# Patient Record
Sex: MALE | Race: WHITE | NOT HISPANIC OR LATINO | Employment: PART TIME | URBAN - NONMETROPOLITAN AREA
[De-identification: names, ages, dates, MRNs, and addresses within clinical notes are randomized per-mention and may not be internally consistent; named-entity substitution may affect disease eponyms.]

---

## 2017-12-13 ENCOUNTER — HOSPITAL ENCOUNTER (EMERGENCY)
Facility: HOSPITAL | Age: 37
Discharge: HOME | End: 2017-12-13
Attending: EMERGENCY MEDICINE | Admitting: EMERGENCY MEDICINE

## 2017-12-13 VITALS
SYSTOLIC BLOOD PRESSURE: 113 MMHG | OXYGEN SATURATION: 98 % | WEIGHT: 180 LBS | HEIGHT: 72 IN | TEMPERATURE: 99.1 F | RESPIRATION RATE: 16 BRPM | BODY MASS INDEX: 24.38 KG/M2 | DIASTOLIC BLOOD PRESSURE: 66 MMHG | HEART RATE: 82 BPM

## 2017-12-13 DIAGNOSIS — A54.9 GONORRHEA IN MALE: Primary | ICD-10-CM

## 2017-12-13 DIAGNOSIS — A64 STI (SEXUALLY TRANSMITTED INFECTION): ICD-10-CM

## 2017-12-13 LAB
6-ACETYL MORPHINE: NEGATIVE
ALBUMIN SERPL-MCNC: 4.1 G/DL (ref 3.5–5)
ALBUMIN/GLOB SERPL: 1.3 G/DL (ref 1.5–2.5)
ALP SERPL-CCNC: 74 U/L (ref 40–129)
ALT SERPL W P-5'-P-CCNC: 15 U/L (ref 10–44)
AMPHET+METHAMPHET UR QL: POSITIVE
ANION GAP SERPL CALCULATED.3IONS-SCNC: 4.3 MMOL/L (ref 3.6–11.2)
AST SERPL-CCNC: 21 U/L (ref 10–34)
BACTERIA UR QL AUTO: ABNORMAL /HPF
BARBITURATES UR QL SCN: NEGATIVE
BASOPHILS # BLD AUTO: 0.04 10*3/MM3 (ref 0–0.3)
BASOPHILS NFR BLD AUTO: 0.3 % (ref 0–2)
BENZODIAZ UR QL SCN: NEGATIVE
BILIRUB SERPL-MCNC: 0.2 MG/DL (ref 0.2–1.8)
BILIRUB UR QL STRIP: NEGATIVE
BUN BLD-MCNC: 10 MG/DL (ref 7–21)
BUN/CREAT SERPL: 12.7 (ref 7–25)
BUPRENORPHINE SERPL-MCNC: NEGATIVE NG/ML
C TRACH RRNA CVX QL NAA+PROBE: NOT DETECTED
CALCIUM SPEC-SCNC: 9 MG/DL (ref 7.7–10)
CANNABINOIDS SERPL QL: NEGATIVE
CHLORIDE SERPL-SCNC: 109 MMOL/L (ref 99–112)
CLARITY UR: ABNORMAL
CO2 SERPL-SCNC: 29.7 MMOL/L (ref 24.3–31.9)
COCAINE UR QL: NEGATIVE
COLOR UR: YELLOW
CREAT BLD-MCNC: 0.79 MG/DL (ref 0.43–1.29)
CRP SERPL-MCNC: 2.74 MG/DL (ref 0–0.99)
DEPRECATED RDW RBC AUTO: 42.9 FL (ref 37–54)
EOSINOPHIL # BLD AUTO: 0.52 10*3/MM3 (ref 0–0.7)
EOSINOPHIL NFR BLD AUTO: 4.4 % (ref 0–5)
ERYTHROCYTE [DISTWIDTH] IN BLOOD BY AUTOMATED COUNT: 13 % (ref 11.5–14.5)
ERYTHROCYTE [SEDIMENTATION RATE] IN BLOOD: 25 MM/HR (ref 0–15)
GFR SERPL CREATININE-BSD FRML MDRD: 111 ML/MIN/1.73
GLOBULIN UR ELPH-MCNC: 3.1 GM/DL
GLUCOSE BLD-MCNC: 86 MG/DL (ref 70–110)
GLUCOSE UR STRIP-MCNC: NEGATIVE MG/DL
HCT VFR BLD AUTO: 38.9 % (ref 42–52)
HGB BLD-MCNC: 12.9 G/DL (ref 14–18)
HGB UR QL STRIP.AUTO: ABNORMAL
HYALINE CASTS UR QL AUTO: ABNORMAL /LPF
IMM GRANULOCYTES # BLD: 0.03 10*3/MM3 (ref 0–0.03)
IMM GRANULOCYTES NFR BLD: 0.3 % (ref 0–0.5)
KETONES UR QL STRIP: NEGATIVE
LEUKOCYTE ESTERASE UR QL STRIP.AUTO: ABNORMAL
LYMPHOCYTES # BLD AUTO: 1.66 10*3/MM3 (ref 1–3)
LYMPHOCYTES NFR BLD AUTO: 13.9 % (ref 21–51)
MCH RBC QN AUTO: 31 PG (ref 27–33)
MCHC RBC AUTO-ENTMCNC: 33.2 G/DL (ref 33–37)
MCV RBC AUTO: 93.5 FL (ref 80–94)
METHADONE UR QL SCN: NEGATIVE
MONOCYTES # BLD AUTO: 1.39 10*3/MM3 (ref 0.1–0.9)
MONOCYTES NFR BLD AUTO: 11.7 % (ref 0–10)
N GONORRHOEA RRNA SPEC QL NAA+PROBE: DETECTED
NEUTROPHILS # BLD AUTO: 8.26 10*3/MM3 (ref 1.4–6.5)
NEUTROPHILS NFR BLD AUTO: 69.4 % (ref 30–70)
NITRITE UR QL STRIP: NEGATIVE
OPIATES UR QL: NEGATIVE
OSMOLALITY SERPL CALC.SUM OF ELEC: 283.3 MOSM/KG (ref 273–305)
OXYCODONE UR QL SCN: NEGATIVE
PCP UR QL SCN: NEGATIVE
PH UR STRIP.AUTO: 5.5 [PH] (ref 5–8)
PLATELET # BLD AUTO: 301 10*3/MM3 (ref 130–400)
PMV BLD AUTO: 9.3 FL (ref 6–10)
POTASSIUM BLD-SCNC: 3.9 MMOL/L (ref 3.5–5.3)
PROT SERPL-MCNC: 7.2 G/DL (ref 6–8)
PROT UR QL STRIP: ABNORMAL
RBC # BLD AUTO: 4.16 10*6/MM3 (ref 4.7–6.1)
RBC # UR: ABNORMAL /HPF
REF LAB TEST METHOD: ABNORMAL
SODIUM BLD-SCNC: 143 MMOL/L (ref 135–153)
SP GR UR STRIP: 1.03 (ref 1–1.03)
SQUAMOUS #/AREA URNS HPF: ABNORMAL /HPF
UROBILINOGEN UR QL STRIP: ABNORMAL
WBC NRBC COR # BLD: 11.9 10*3/MM3 (ref 4.5–12.5)
WBC UR QL AUTO: ABNORMAL /HPF

## 2017-12-13 PROCEDURE — 85025 COMPLETE CBC W/AUTO DIFF WBC: CPT | Performed by: PHYSICIAN ASSISTANT

## 2017-12-13 PROCEDURE — 81001 URINALYSIS AUTO W/SCOPE: CPT | Performed by: PHYSICIAN ASSISTANT

## 2017-12-13 PROCEDURE — 87086 URINE CULTURE/COLONY COUNT: CPT | Performed by: PHYSICIAN ASSISTANT

## 2017-12-13 PROCEDURE — 99283 EMERGENCY DEPT VISIT LOW MDM: CPT

## 2017-12-13 PROCEDURE — 80307 DRUG TEST PRSMV CHEM ANLYZR: CPT | Performed by: PHYSICIAN ASSISTANT

## 2017-12-13 PROCEDURE — 85652 RBC SED RATE AUTOMATED: CPT | Performed by: PHYSICIAN ASSISTANT

## 2017-12-13 PROCEDURE — 87491 CHLMYD TRACH DNA AMP PROBE: CPT | Performed by: PHYSICIAN ASSISTANT

## 2017-12-13 PROCEDURE — 25010000002 KETOROLAC TROMETHAMINE PER 15 MG: Performed by: PHYSICIAN ASSISTANT

## 2017-12-13 PROCEDURE — 96365 THER/PROPH/DIAG IV INF INIT: CPT

## 2017-12-13 PROCEDURE — 25010000002 CEFTRIAXONE: Performed by: PHYSICIAN ASSISTANT

## 2017-12-13 PROCEDURE — 87591 N.GONORRHOEAE DNA AMP PROB: CPT | Performed by: PHYSICIAN ASSISTANT

## 2017-12-13 PROCEDURE — 96375 TX/PRO/DX INJ NEW DRUG ADDON: CPT

## 2017-12-13 PROCEDURE — 86140 C-REACTIVE PROTEIN: CPT | Performed by: PHYSICIAN ASSISTANT

## 2017-12-13 PROCEDURE — 80053 COMPREHEN METABOLIC PANEL: CPT | Performed by: PHYSICIAN ASSISTANT

## 2017-12-13 RX ORDER — SODIUM CHLORIDE 0.9 % (FLUSH) 0.9 %
10 SYRINGE (ML) INJECTION AS NEEDED
Status: DISCONTINUED | OUTPATIENT
Start: 2017-12-13 | End: 2017-12-13 | Stop reason: HOSPADM

## 2017-12-13 RX ORDER — KETOROLAC TROMETHAMINE 30 MG/ML
30 INJECTION, SOLUTION INTRAMUSCULAR; INTRAVENOUS ONCE
Status: COMPLETED | OUTPATIENT
Start: 2017-12-13 | End: 2017-12-13

## 2017-12-13 RX ORDER — DOXYCYCLINE 100 MG/1
100 CAPSULE ORAL 2 TIMES DAILY
Qty: 20 CAPSULE | Refills: 0 | Status: ON HOLD | OUTPATIENT
Start: 2017-12-13 | End: 2018-07-30

## 2017-12-13 RX ORDER — IBUPROFEN 800 MG/1
800 TABLET ORAL EVERY 6 HOURS PRN
Qty: 60 TABLET | Refills: 0 | Status: ON HOLD | OUTPATIENT
Start: 2017-12-13 | End: 2018-07-30

## 2017-12-13 RX ADMIN — KETOROLAC TROMETHAMINE 30 MG: 30 INJECTION, SOLUTION INTRAMUSCULAR at 12:57

## 2017-12-13 RX ADMIN — CEFTRIAXONE 1 G: 1 INJECTION, POWDER, FOR SOLUTION INTRAMUSCULAR; INTRAVENOUS at 14:02

## 2017-12-13 RX ADMIN — SODIUM CHLORIDE 500 ML: 9 INJECTION, SOLUTION INTRAVENOUS at 12:57

## 2017-12-13 NOTE — ED PROVIDER NOTES
Subjective   HPI Comments: 37-year-old male presents with chief complaint penile discharge and burning with urination for the past 2 days.  Patient states she's had yellow discharge from his penis.  States that his girlfriend slept with 5 other than last month he thinks he was exposed to sexual transmitted infection.    Patient is a 37 y.o. male presenting with male genitourinary complaint.   History provided by:  Patient   used: No    Male  Problem   Presenting symptoms: dysuria, penile discharge, penile pain and swelling    Context: after urination and during urination    Context: not after injury and not after intercourse    Relieved by:  Nothing  Worsened by:  Nothing  Associated symptoms: urinary retention    Associated symptoms: no abdominal pain, no diarrhea, no genital itching, no groin pain, no hematuria, no nausea and no urinary incontinence    Risk factors: STI exposure and unprotected sex    Risk factors: no bladder surgery, no change in medication, no erectile dysfunction, no foreign body, does not have multiple sexual partners and no new sexual partner        Review of Systems   Gastrointestinal: Negative for abdominal pain, diarrhea and nausea.   Genitourinary: Positive for discharge, dysuria and penile pain. Negative for bladder incontinence and hematuria.   Skin: Positive for rash. Negative for wound.   All other systems reviewed and are negative.      Past Medical History:   Diagnosis Date   • Substance abuse    • Suicide attempt        No Known Allergies    Past Surgical History:   Procedure Laterality Date   • EYE SURGERY         Family History   Problem Relation Age of Onset   • Anxiety disorder Mother    • Drug abuse Father    • Alcohol abuse Father    • Anxiety disorder Paternal Aunt    • Anxiety disorder Maternal Grandmother    • Dementia Maternal Grandmother    • ADD / ADHD Neg Hx    • Bipolar disorder Neg Hx    • Depression Neg Hx    • OCD Neg Hx    • Paranoid behavior  Neg Hx    • Schizophrenia Neg Hx    • Seizures Neg Hx    • Suicide Attempts Neg Hx    • Self-Injurious Behavior  Neg Hx        Social History     Social History   • Marital status:      Spouse name: N/A   • Number of children: N/A   • Years of education: N/A     Social History Main Topics   • Smoking status: Current Every Day Smoker     Packs/day: 2.00   • Smokeless tobacco: None   • Alcohol use No   • Drug use: Yes     Special: Methamphetamines   • Sexual activity: Defer     Other Topics Concern   • None     Social History Narrative           Objective   Physical Exam   Constitutional: He is oriented to person, place, and time. He appears well-developed and well-nourished.   HENT:   Head: Normocephalic and atraumatic.   Right Ear: External ear normal.   Left Ear: External ear normal.   Nose: Nose normal.   Mouth/Throat: Oropharynx is clear and moist.   Eyes: Conjunctivae and EOM are normal. Pupils are equal, round, and reactive to light.   Neck: Normal range of motion. Neck supple.   Cardiovascular: Normal rate, regular rhythm, normal heart sounds and intact distal pulses.    Pulmonary/Chest: Effort normal and breath sounds normal.   Abdominal: Soft. Bowel sounds are normal.   Genitourinary: Rectal exam shows guaiac negative stool. Right testis shows no swelling and no tenderness. Left testis shows no swelling and no tenderness. Circumcised. Penile erythema and penile tenderness present. Discharge found.   Musculoskeletal: Normal range of motion. He exhibits no edema, tenderness or deformity.        Lumbar back: He exhibits swelling, pain and spasm. He exhibits no deformity.   Neurological: He is alert and oriented to person, place, and time. He has normal reflexes.   Skin: Skin is warm and dry.   Psychiatric: He has a normal mood and affect. His behavior is normal. Judgment and thought content normal.   Nursing note and vitals reviewed.      Procedures         ED Course  ED Course   Comment By Time   This  is a 37-year-old male that presents with chief complaint dysuria and penile discharge.  Patient did have positive gonorrhea test.  Patient was given IV Rocephin. Advised to return if condition worsens. Rene Marroquin PA-C 12/13 1502                  MDM  Number of Diagnoses or Management Options  Gonorrhea in male: new and requires workup  STI (sexually transmitted infection): new and requires workup     Amount and/or Complexity of Data Reviewed  Clinical lab tests: reviewed    Risk of Complications, Morbidity, and/or Mortality  Presenting problems: moderate  Diagnostic procedures: moderate  Management options: moderate    Patient Progress  Patient progress: stable      Final diagnoses:   Gonorrhea in male   STI (sexually transmitted infection)            Rene Marroquin PA-C  12/13/17 5135

## 2017-12-15 LAB — BACTERIA SPEC AEROBE CULT: NORMAL

## 2018-02-18 ENCOUNTER — HOSPITAL ENCOUNTER (EMERGENCY)
Facility: HOSPITAL | Age: 38
Discharge: HOME | End: 2018-02-18
Attending: FAMILY MEDICINE | Admitting: FAMILY MEDICINE

## 2018-02-18 ENCOUNTER — APPOINTMENT (OUTPATIENT)
Dept: GENERAL RADIOLOGY | Facility: HOSPITAL | Age: 38
End: 2018-02-18

## 2018-02-18 VITALS
BODY MASS INDEX: 24.38 KG/M2 | RESPIRATION RATE: 16 BRPM | HEART RATE: 65 BPM | TEMPERATURE: 97.9 F | OXYGEN SATURATION: 100 % | HEIGHT: 72 IN | WEIGHT: 180 LBS | DIASTOLIC BLOOD PRESSURE: 82 MMHG | SYSTOLIC BLOOD PRESSURE: 119 MMHG

## 2018-02-18 DIAGNOSIS — S52.501A CLOSED FRACTURE OF DISTAL END OF RIGHT RADIUS, UNSPECIFIED FRACTURE MORPHOLOGY, INITIAL ENCOUNTER: Primary | ICD-10-CM

## 2018-02-18 PROCEDURE — 99283 EMERGENCY DEPT VISIT LOW MDM: CPT

## 2018-02-18 PROCEDURE — 73130 X-RAY EXAM OF HAND: CPT

## 2018-02-18 PROCEDURE — 73110 X-RAY EXAM OF WRIST: CPT

## 2018-02-18 PROCEDURE — 73110 X-RAY EXAM OF WRIST: CPT | Performed by: RADIOLOGY

## 2018-02-18 PROCEDURE — 73130 X-RAY EXAM OF HAND: CPT | Performed by: RADIOLOGY

## 2018-02-18 RX ORDER — HYDROCODONE BITARTRATE AND ACETAMINOPHEN 7.5; 325 MG/1; MG/1
1 TABLET ORAL EVERY 6 HOURS PRN
Qty: 12 TABLET | Refills: 0 | Status: ON HOLD | OUTPATIENT
Start: 2018-02-18 | End: 2018-07-30

## 2018-02-18 RX ORDER — HYDROCODONE BITARTRATE AND ACETAMINOPHEN 7.5; 325 MG/1; MG/1
1 TABLET ORAL ONCE
Status: COMPLETED | OUTPATIENT
Start: 2018-02-18 | End: 2018-02-18

## 2018-02-18 RX ADMIN — HYDROCODONE BITARTRATE AND ACETAMINOPHEN 1 TABLET: 7.5; 325 TABLET ORAL at 08:18

## 2018-02-18 NOTE — ED NOTES
"13\" of 4\" volar splint extremity splint applied to right wrist by tech. Distal pulses intact, sensation intact and motor within normal limits.        Unique Thomas  02/18/18 0933    "

## 2018-02-18 NOTE — ED PROVIDER NOTES
Subjective   HPI Comments: 37-year-old male who presents today after saqib a door and injuring his right hand and wrist.  He states this occurred approximately 4 hours ago.  He states he was angry because his significant other was video chatting another man while he was sleeping.  He denies any previous injury to this area.  He states he did not take any medication prior to arrival.    Patient is a 37 y.o. male presenting with upper extremity pain.   History provided by:  Patient  Upper Extremity Issue   Location:  Wrist and hand  Wrist location:  R wrist  Hand location:  Dorsum of R hand  Injury: yes    Time since incident:  4 hours  Mechanism of injury comment:  Punched a door  Pain details:     Quality:  Throbbing    Radiates to:  Does not radiate    Severity:  Moderate    Onset quality:  Sudden    Timing:  Constant    Progression:  Worsening  Handedness:  Right-handed  Prior injury to area:  No  Relieved by:  Nothing  Worsened by:  Movement  Ineffective treatments:  None tried  Associated symptoms: decreased range of motion and swelling    Associated symptoms: no back pain, no fatigue, no fever, no muscle weakness, no neck pain, no numbness, no stiffness and no tingling        Review of Systems   Constitutional: Negative for fatigue and fever.   HENT: Negative.    Eyes: Negative.    Respiratory: Negative.    Cardiovascular: Negative.    Gastrointestinal: Negative.    Genitourinary: Negative.    Musculoskeletal: Positive for arthralgias. Negative for back pain, neck pain and stiffness.   Skin: Negative.    Neurological: Negative.    Psychiatric/Behavioral: Negative.    All other systems reviewed and are negative.      Past Medical History:   Diagnosis Date   • Substance abuse    • Suicide attempt        No Known Allergies    Past Surgical History:   Procedure Laterality Date   • EYE SURGERY         Family History   Problem Relation Age of Onset   • Anxiety disorder Mother    • Drug abuse Father    • Alcohol abuse  Father    • Anxiety disorder Paternal Aunt    • Anxiety disorder Maternal Grandmother    • Dementia Maternal Grandmother    • ADD / ADHD Neg Hx    • Bipolar disorder Neg Hx    • Depression Neg Hx    • OCD Neg Hx    • Paranoid behavior Neg Hx    • Schizophrenia Neg Hx    • Seizures Neg Hx    • Suicide Attempts Neg Hx    • Self-Injurious Behavior  Neg Hx        Social History     Social History   • Marital status:      Spouse name: N/A   • Number of children: N/A   • Years of education: N/A     Social History Main Topics   • Smoking status: Current Every Day Smoker     Packs/day: 2.00   • Smokeless tobacco: None   • Alcohol use No   • Drug use: Yes     Special: Methamphetamines   • Sexual activity: Defer     Other Topics Concern   • None     Social History Narrative           Objective   Physical Exam   Constitutional: He is oriented to person, place, and time. He appears well-developed and well-nourished. No distress.   HENT:   Head: Normocephalic and atraumatic.   Right Ear: External ear normal.   Left Ear: External ear normal.   Nose: Nose normal.   Mouth/Throat: Oropharynx is clear and moist.   Eyes: Conjunctivae and EOM are normal. Pupils are equal, round, and reactive to light.   Neck: Normal range of motion. Neck supple.   Cardiovascular: Normal rate, regular rhythm, normal heart sounds and intact distal pulses.    Pulmonary/Chest: Effort normal and breath sounds normal. No respiratory distress.   Abdominal: Soft. Bowel sounds are normal. There is no tenderness.   Musculoskeletal: He exhibits tenderness. He exhibits no deformity.   Tenderness to the right wrist, mild swelling more so on the radial side.  Right radial pulse is 2+.  Cap refill is < 2 seconds. No tenderness or swelling to the right phalanges.  No snuff box tenderness on the right.     Neurological: He is alert and oriented to person, place, and time.   Skin: Skin is warm and dry.   Psychiatric: He has a normal mood and affect. His behavior  is normal. Judgment and thought content normal.   Nursing note and vitals reviewed.      Splint - Cast - Strapping  Date/Time: 2/18/2018 9:39 AM  Performed by: SITA FINNEY  Authorized by: PALOMA GARCIA     Consent:     Consent obtained:  Verbal    Consent given by:  Patient  Pre-procedure details:     Sensation:  Normal    Skin color:  Pink  Procedure details:     Laterality:  Right    Location:  Wrist    Wrist:  R wrist    Splint type:  Volar short arm    Supplies:  Ortho-Glass  Post-procedure details:     Pain:  Improved    Sensation:  Normal    Patient tolerance of procedure:  Tolerated well, no immediate complications             ED Course  ED Course   Comment By Time   Distal radius fracture seen on wrist x-ray, neg hand x-ray.  Patient to be placed in a short arm volar splint and sling and will follow up with ortho. JORGE Fraire 02/18 0902   [facility] queried #[id number] JORGE Fraire 02/18 0904                  MDM  Number of Diagnoses or Management Options  Closed fracture of distal end of right radius, unspecified fracture morphology, initial encounter:      Amount and/or Complexity of Data Reviewed  Tests in the radiology section of CPT®: reviewed    Patient Progress  Patient progress: stable      Final diagnoses:   Closed fracture of distal end of right radius, unspecified fracture morphology, initial encounter            JORGE Fraire  02/18/18 0950

## 2018-02-18 NOTE — DISCHARGE INSTRUCTIONS
"Wear your splint until you follow up with ortho.  Use ice and elevate.    Hypertension  Hypertension, commonly called high blood pressure, is when the force of blood pumping through the arteries is too strong. The arteries are the blood vessels that carry blood from the heart throughout the body. Hypertension forces the heart to work harder to pump blood and may cause arteries to become narrow or stiff. Having untreated or uncontrolled hypertension can cause heart attacks, strokes, kidney disease, and other problems.  A blood pressure reading consists of a higher number over a lower number. Ideally, your blood pressure should be below 120/80. The first (\"top\") number is called the systolic pressure. It is a measure of the pressure in your arteries as your heart beats. The second (\"bottom\") number is called the diastolic pressure. It is a measure of the pressure in your arteries as the heart relaxes.  What are the causes?  The cause of this condition is not known.  What increases the risk?  Some risk factors for high blood pressure are under your control. Others are not.  Factors you can change   · Smoking.  · Having type 2 diabetes mellitus, high cholesterol, or both.  · Not getting enough exercise or physical activity.  · Being overweight.  · Having too much fat, sugar, calories, or salt (sodium) in your diet.  · Drinking too much alcohol.  Factors that are difficult or impossible to change   · Having chronic kidney disease.  · Having a family history of high blood pressure.  · Age. Risk increases with age.  · Race. You may be at higher risk if you are -American.  · Gender. Men are at higher risk than women before age 45. After age 65, women are at higher risk than men.  · Having obstructive sleep apnea.  · Stress.  What are the signs or symptoms?  Extremely high blood pressure (hypertensive crisis) may cause:  · Headache.  · Anxiety.  · Shortness of breath.  · Nosebleed.  · Nausea and vomiting.  · Severe " chest pain.  · Jerky movements you cannot control (seizures).  How is this diagnosed?  This condition is diagnosed by measuring your blood pressure while you are seated, with your arm resting on a surface. The cuff of the blood pressure monitor will be placed directly against the skin of your upper arm at the level of your heart. It should be measured at least twice using the same arm. Certain conditions can cause a difference in blood pressure between your right and left arms.  Certain factors can cause blood pressure readings to be lower or higher than normal (elevated) for a short period of time:  · When your blood pressure is higher when you are in a health care provider's office than when you are at home, this is called white coat hypertension. Most people with this condition do not need medicines.  · When your blood pressure is higher at home than when you are in a health care provider's office, this is called masked hypertension. Most people with this condition may need medicines to control blood pressure.  If you have a high blood pressure reading during one visit or you have normal blood pressure with other risk factors:  · You may be asked to return on a different day to have your blood pressure checked again.  · You may be asked to monitor your blood pressure at home for 1 week or longer.  If you are diagnosed with hypertension, you may have other blood or imaging tests to help your health care provider understand your overall risk for other conditions.  How is this treated?  This condition is treated by making healthy lifestyle changes, such as eating healthy foods, exercising more, and reducing your alcohol intake. Your health care provider may prescribe medicine if lifestyle changes are not enough to get your blood pressure under control, and if:  · Your systolic blood pressure is above 130.  · Your diastolic blood pressure is above 80.  Your personal target blood pressure may vary depending on your  medical conditions, your age, and other factors.  Follow these instructions at home:  Eating and drinking   · Eat a diet that is high in fiber and potassium, and low in sodium, added sugar, and fat. An example eating plan is called the DASH (Dietary Approaches to Stop Hypertension) diet. To eat this way:  ¨ Eat plenty of fresh fruits and vegetables. Try to fill half of your plate at each meal with fruits and vegetables.  ¨ Eat whole grains, such as whole wheat pasta, brown rice, or whole grain bread. Fill about one quarter of your plate with whole grains.  ¨ Eat or drink low-fat dairy products, such as skim milk or low-fat yogurt.  ¨ Avoid fatty cuts of meat, processed or cured meats, and poultry with skin. Fill about one quarter of your plate with lean proteins, such as fish, chicken without skin, beans, eggs, and tofu.  ¨ Avoid premade and processed foods. These tend to be higher in sodium, added sugar, and fat.  · Reduce your daily sodium intake. Most people with hypertension should eat less than 1,500 mg of sodium a day.  · Limit alcohol intake to no more than 1 drink a day for nonpregnant women and 2 drinks a day for men. One drink equals 12 oz of beer, 5 oz of wine, or 1½ oz of hard liquor.  Lifestyle   · Work with your health care provider to maintain a healthy body weight or to lose weight. Ask what an ideal weight is for you.  · Get at least 30 minutes of exercise that causes your heart to beat faster (aerobic exercise) most days of the week. Activities may include walking, swimming, or biking.  · Include exercise to strengthen your muscles (resistance exercise), such as pilates or lifting weights, as part of your weekly exercise routine. Try to do these types of exercises for 30 minutes at least 3 days a week.  · Do not use any products that contain nicotine or tobacco, such as cigarettes and e-cigarettes. If you need help quitting, ask your health care provider.  · Monitor your blood pressure at home as  told by your health care provider.  · Keep all follow-up visits as told by your health care provider. This is important.  Medicines   · Take over-the-counter and prescription medicines only as told by your health care provider. Follow directions carefully. Blood pressure medicines must be taken as prescribed.  · Do not skip doses of blood pressure medicine. Doing this puts you at risk for problems and can make the medicine less effective.  · Ask your health care provider about side effects or reactions to medicines that you should watch for.  Contact a health care provider if:  · You think you are having a reaction to a medicine you are taking.  · You have headaches that keep coming back (recurring).  · You feel dizzy.  · You have swelling in your ankles.  · You have trouble with your vision.  Get help right away if:  · You develop a severe headache or confusion.  · You have unusual weakness or numbness.  · You feel faint.  · You have severe pain in your chest or abdomen.  · You vomit repeatedly.  · You have trouble breathing.  Summary  · Hypertension is when the force of blood pumping through your arteries is too strong. If this condition is not controlled, it may put you at risk for serious complications.  · Your personal target blood pressure may vary depending on your medical conditions, your age, and other factors. For most people, a normal blood pressure is less than 120/80.  · Hypertension is treated with lifestyle changes, medicines, or a combination of both. Lifestyle changes include weight loss, eating a healthy, low-sodium diet, exercising more, and limiting alcohol.  This information is not intended to replace advice given to you by your health care provider. Make sure you discuss any questions you have with your health care provider.  Document Released: 12/18/2006 Document Revised: 11/15/2017 Document Reviewed: 11/15/2017  [employer] Interactive Patient Education © 2017 [employer]

## 2018-07-30 ENCOUNTER — HOSPITAL ENCOUNTER (INPATIENT)
Facility: HOSPITAL | Age: 38
LOS: 4 days | Discharge: HOME | End: 2018-08-03
Attending: PSYCHIATRY & NEUROLOGY | Admitting: PSYCHIATRY & NEUROLOGY

## 2018-07-30 ENCOUNTER — HOSPITAL ENCOUNTER (EMERGENCY)
Facility: HOSPITAL | Age: 38
Discharge: PSYCHIATRIC OR SUBSTANCE USE FACILITY | End: 2018-07-30
Attending: EMERGENCY MEDICINE | Admitting: EMERGENCY MEDICINE

## 2018-07-30 VITALS
HEART RATE: 80 BPM | WEIGHT: 180 LBS | TEMPERATURE: 98.2 F | SYSTOLIC BLOOD PRESSURE: 134 MMHG | DIASTOLIC BLOOD PRESSURE: 68 MMHG | HEIGHT: 72 IN | OXYGEN SATURATION: 99 % | BODY MASS INDEX: 24.38 KG/M2 | RESPIRATION RATE: 18 BRPM

## 2018-07-30 DIAGNOSIS — R45.851 DEPRESSION WITH SUICIDAL IDEATION: Primary | ICD-10-CM

## 2018-07-30 DIAGNOSIS — F19.10 SUBSTANCE ABUSE (HCC): ICD-10-CM

## 2018-07-30 DIAGNOSIS — F32.A DEPRESSION WITH SUICIDAL IDEATION: Primary | ICD-10-CM

## 2018-07-30 LAB
6-ACETYL MORPHINE: NEGATIVE
ALBUMIN SERPL-MCNC: 4.1 G/DL (ref 3.5–5)
ALBUMIN/GLOB SERPL: 1.4 G/DL (ref 1.5–2.5)
ALP SERPL-CCNC: 71 U/L (ref 40–129)
ALT SERPL W P-5'-P-CCNC: 16 U/L (ref 10–44)
AMPHET+METHAMPHET UR QL: NEGATIVE
ANION GAP SERPL CALCULATED.3IONS-SCNC: 3 MMOL/L (ref 3.6–11.2)
AST SERPL-CCNC: 21 U/L (ref 10–34)
BARBITURATES UR QL SCN: NEGATIVE
BASOPHILS # BLD AUTO: 0.06 10*3/MM3 (ref 0–0.3)
BASOPHILS NFR BLD AUTO: 0.8 % (ref 0–2)
BENZODIAZ UR QL SCN: NEGATIVE
BILIRUB SERPL-MCNC: 0.3 MG/DL (ref 0.2–1.8)
BILIRUB UR QL STRIP: NEGATIVE
BUN BLD-MCNC: 6 MG/DL (ref 7–21)
BUN/CREAT SERPL: 7.4 (ref 7–25)
BUPRENORPHINE SERPL-MCNC: NEGATIVE NG/ML
CALCIUM SPEC-SCNC: 9.3 MG/DL (ref 7.7–10)
CANNABINOIDS SERPL QL: NEGATIVE
CHLORIDE SERPL-SCNC: 107 MMOL/L (ref 99–112)
CLARITY UR: CLEAR
CO2 SERPL-SCNC: 31 MMOL/L (ref 24.3–31.9)
COCAINE UR QL: NEGATIVE
COLOR UR: YELLOW
CREAT BLD-MCNC: 0.81 MG/DL (ref 0.43–1.29)
DEPRECATED RDW RBC AUTO: 41.6 FL (ref 37–54)
EOSINOPHIL # BLD AUTO: 0.56 10*3/MM3 (ref 0–0.7)
EOSINOPHIL NFR BLD AUTO: 7.5 % (ref 0–5)
ERYTHROCYTE [DISTWIDTH] IN BLOOD BY AUTOMATED COUNT: 12.4 % (ref 11.5–14.5)
ETHANOL BLD-MCNC: <10 MG/DL
ETHANOL UR QL: <0.01 %
GFR SERPL CREATININE-BSD FRML MDRD: 108 ML/MIN/1.73
GLOBULIN UR ELPH-MCNC: 3 GM/DL
GLUCOSE BLD-MCNC: 89 MG/DL (ref 70–110)
GLUCOSE UR STRIP-MCNC: NEGATIVE MG/DL
HCT VFR BLD AUTO: 46.6 % (ref 42–52)
HGB BLD-MCNC: 15.3 G/DL (ref 14–18)
HGB UR QL STRIP.AUTO: NEGATIVE
IMM GRANULOCYTES # BLD: 0.03 10*3/MM3 (ref 0–0.03)
IMM GRANULOCYTES NFR BLD: 0.4 % (ref 0–0.5)
KETONES UR QL STRIP: NEGATIVE
LEUKOCYTE ESTERASE UR QL STRIP.AUTO: NEGATIVE
LYMPHOCYTES # BLD AUTO: 1.87 10*3/MM3 (ref 1–3)
LYMPHOCYTES NFR BLD AUTO: 25.2 % (ref 21–51)
MAGNESIUM SERPL-MCNC: 1.8 MG/DL (ref 1.7–2.6)
MCH RBC QN AUTO: 30.1 PG (ref 27–33)
MCHC RBC AUTO-ENTMCNC: 32.8 G/DL (ref 33–37)
MCV RBC AUTO: 91.7 FL (ref 80–94)
METHADONE UR QL SCN: NEGATIVE
MONOCYTES # BLD AUTO: 0.58 10*3/MM3 (ref 0.1–0.9)
MONOCYTES NFR BLD AUTO: 7.8 % (ref 0–10)
NEUTROPHILS # BLD AUTO: 4.33 10*3/MM3 (ref 1.4–6.5)
NEUTROPHILS NFR BLD AUTO: 58.3 % (ref 30–70)
NITRITE UR QL STRIP: NEGATIVE
OPIATES UR QL: NEGATIVE
OSMOLALITY SERPL CALC.SUM OF ELEC: 278.3 MOSM/KG (ref 273–305)
OXYCODONE UR QL SCN: NEGATIVE
PCP UR QL SCN: NEGATIVE
PH UR STRIP.AUTO: 7.5 [PH] (ref 5–8)
PLATELET # BLD AUTO: 277 10*3/MM3 (ref 130–400)
PMV BLD AUTO: 10.5 FL (ref 6–10)
POTASSIUM BLD-SCNC: 4.3 MMOL/L (ref 3.5–5.3)
PROT SERPL-MCNC: 7.1 G/DL (ref 6–8)
PROT UR QL STRIP: NEGATIVE
RBC # BLD AUTO: 5.08 10*6/MM3 (ref 4.7–6.1)
SODIUM BLD-SCNC: 141 MMOL/L (ref 135–153)
SP GR UR STRIP: 1.02 (ref 1–1.03)
UROBILINOGEN UR QL STRIP: NORMAL
WBC NRBC COR # BLD: 7.43 10*3/MM3 (ref 4.5–12.5)

## 2018-07-30 PROCEDURE — 99284 EMERGENCY DEPT VISIT MOD MDM: CPT

## 2018-07-30 PROCEDURE — HZ2ZZZZ DETOXIFICATION SERVICES FOR SUBSTANCE ABUSE TREATMENT: ICD-10-PCS | Performed by: PSYCHIATRY & NEUROLOGY

## 2018-07-30 PROCEDURE — 80307 DRUG TEST PRSMV CHEM ANLYZR: CPT | Performed by: PHYSICIAN ASSISTANT

## 2018-07-30 PROCEDURE — 83735 ASSAY OF MAGNESIUM: CPT | Performed by: PHYSICIAN ASSISTANT

## 2018-07-30 PROCEDURE — 93005 ELECTROCARDIOGRAM TRACING: CPT | Performed by: PSYCHIATRY & NEUROLOGY

## 2018-07-30 PROCEDURE — 85025 COMPLETE CBC W/AUTO DIFF WBC: CPT | Performed by: PHYSICIAN ASSISTANT

## 2018-07-30 PROCEDURE — 81003 URINALYSIS AUTO W/O SCOPE: CPT | Performed by: PHYSICIAN ASSISTANT

## 2018-07-30 PROCEDURE — 80053 COMPREHEN METABOLIC PANEL: CPT | Performed by: PHYSICIAN ASSISTANT

## 2018-07-30 PROCEDURE — 93010 ELECTROCARDIOGRAM REPORT: CPT | Performed by: INTERNAL MEDICINE

## 2018-07-30 RX ORDER — BENZONATATE 100 MG/1
100 CAPSULE ORAL 3 TIMES DAILY PRN
Status: DISCONTINUED | OUTPATIENT
Start: 2018-07-30 | End: 2018-08-03 | Stop reason: HOSPADM

## 2018-07-30 RX ORDER — FAMOTIDINE 20 MG/1
20 TABLET, FILM COATED ORAL 2 TIMES DAILY PRN
Status: DISCONTINUED | OUTPATIENT
Start: 2018-07-30 | End: 2018-08-03 | Stop reason: HOSPADM

## 2018-07-30 RX ORDER — ECHINACEA PURPUREA EXTRACT 125 MG
2 TABLET ORAL AS NEEDED
Status: DISCONTINUED | OUTPATIENT
Start: 2018-07-30 | End: 2018-08-03 | Stop reason: HOSPADM

## 2018-07-30 RX ORDER — MULTIVITAMIN
1 TABLET ORAL DAILY
Status: DISCONTINUED | OUTPATIENT
Start: 2018-07-30 | End: 2018-08-03 | Stop reason: HOSPADM

## 2018-07-30 RX ORDER — BENZTROPINE MESYLATE 1 MG/ML
0.5 INJECTION INTRAMUSCULAR; INTRAVENOUS DAILY PRN
Status: DISCONTINUED | OUTPATIENT
Start: 2018-07-30 | End: 2018-08-03 | Stop reason: HOSPADM

## 2018-07-30 RX ORDER — LORAZEPAM 2 MG/1
2 TABLET ORAL EVERY 6 HOURS PRN
Status: DISCONTINUED | OUTPATIENT
Start: 2018-07-30 | End: 2018-07-31

## 2018-07-30 RX ORDER — HYDROXYZINE 50 MG/1
50 TABLET, FILM COATED ORAL EVERY 6 HOURS PRN
Status: DISCONTINUED | OUTPATIENT
Start: 2018-07-30 | End: 2018-08-03 | Stop reason: HOSPADM

## 2018-07-30 RX ORDER — BENZTROPINE MESYLATE 1 MG/1
1 TABLET ORAL DAILY PRN
Status: DISCONTINUED | OUTPATIENT
Start: 2018-07-30 | End: 2018-08-03 | Stop reason: HOSPADM

## 2018-07-30 RX ORDER — TRAZODONE HYDROCHLORIDE 50 MG/1
50 TABLET ORAL NIGHTLY PRN
Status: DISCONTINUED | OUTPATIENT
Start: 2018-07-30 | End: 2018-08-03 | Stop reason: HOSPADM

## 2018-07-30 RX ORDER — LOPERAMIDE HYDROCHLORIDE 2 MG/1
2 CAPSULE ORAL 4 TIMES DAILY PRN
Status: DISCONTINUED | OUTPATIENT
Start: 2018-07-30 | End: 2018-08-03 | Stop reason: HOSPADM

## 2018-07-30 RX ORDER — THIAMINE HCL 50 MG
100 TABLET ORAL DAILY
Status: DISCONTINUED | OUTPATIENT
Start: 2018-07-30 | End: 2018-08-03 | Stop reason: HOSPADM

## 2018-07-30 RX ORDER — ALUMINA, MAGNESIA, AND SIMETHICONE 2400; 2400; 240 MG/30ML; MG/30ML; MG/30ML
15 SUSPENSION ORAL EVERY 6 HOURS PRN
Status: DISCONTINUED | OUTPATIENT
Start: 2018-07-30 | End: 2018-08-03 | Stop reason: HOSPADM

## 2018-07-30 RX ORDER — IBUPROFEN 600 MG/1
600 TABLET ORAL EVERY 6 HOURS PRN
Status: DISCONTINUED | OUTPATIENT
Start: 2018-07-30 | End: 2018-08-03 | Stop reason: HOSPADM

## 2018-07-30 RX ORDER — NICOTINE 21 MG/24HR
1 PATCH, TRANSDERMAL 24 HOURS TRANSDERMAL
Status: DISCONTINUED | OUTPATIENT
Start: 2018-07-30 | End: 2018-08-01

## 2018-07-30 RX ORDER — ONDANSETRON 4 MG/1
4 TABLET, FILM COATED ORAL EVERY 6 HOURS PRN
Status: DISCONTINUED | OUTPATIENT
Start: 2018-07-30 | End: 2018-08-03 | Stop reason: HOSPADM

## 2018-07-30 RX ADMIN — THIAMINE HCL (VITAMIN B1) 50 MG TABLET 100 MG: at 17:36

## 2018-07-30 RX ADMIN — Medication 1 TABLET: at 17:36

## 2018-07-30 RX ADMIN — TRAZODONE HYDROCHLORIDE 50 MG: 50 TABLET ORAL at 21:16

## 2018-07-30 RX ADMIN — NICOTINE 1 PATCH: 21 PATCH TRANSDERMAL at 16:40

## 2018-07-31 PROBLEM — F43.10 POST TRAUMATIC STRESS DISORDER (PTSD): Status: ACTIVE | Noted: 2018-07-31

## 2018-07-31 PROBLEM — F10.24 ALCOHOL DEPENDENCE WITH ALCOHOL-INDUCED MOOD DISORDER (HCC): Status: ACTIVE | Noted: 2018-07-31

## 2018-07-31 PROCEDURE — 99223 1ST HOSP IP/OBS HIGH 75: CPT | Performed by: PSYCHIATRY & NEUROLOGY

## 2018-07-31 RX ORDER — QUETIAPINE FUMARATE 25 MG/1
50 TABLET, FILM COATED ORAL NIGHTLY
Status: DISCONTINUED | OUTPATIENT
Start: 2018-07-31 | End: 2018-08-03 | Stop reason: HOSPADM

## 2018-07-31 RX ORDER — LORAZEPAM 1 MG/1
1 TABLET ORAL 3 TIMES DAILY
Status: COMPLETED | OUTPATIENT
Start: 2018-07-31 | End: 2018-08-01

## 2018-07-31 RX ADMIN — LORAZEPAM 1 MG: 1 TABLET ORAL at 21:28

## 2018-07-31 RX ADMIN — Medication 1 TABLET: at 08:04

## 2018-07-31 RX ADMIN — IBUPROFEN 600 MG: 600 TABLET ORAL at 11:39

## 2018-07-31 RX ADMIN — LORAZEPAM 1 MG: 1 TABLET ORAL at 15:13

## 2018-07-31 RX ADMIN — SERTRALINE 50 MG: 50 TABLET, FILM COATED ORAL at 15:13

## 2018-07-31 RX ADMIN — QUETIAPINE FUMARATE 50 MG: 25 TABLET, FILM COATED ORAL at 21:28

## 2018-07-31 RX ADMIN — THIAMINE HCL (VITAMIN B1) 50 MG TABLET 100 MG: at 08:04

## 2018-07-31 RX ADMIN — NICOTINE 1 PATCH: 21 PATCH TRANSDERMAL at 08:05

## 2018-08-01 PROCEDURE — 99232 SBSQ HOSP IP/OBS MODERATE 35: CPT | Performed by: PSYCHIATRY & NEUROLOGY

## 2018-08-01 RX ORDER — VENLAFAXINE HYDROCHLORIDE 75 MG/1
75 CAPSULE, EXTENDED RELEASE ORAL
Status: DISCONTINUED | OUTPATIENT
Start: 2018-08-02 | End: 2018-08-03 | Stop reason: HOSPADM

## 2018-08-01 RX ORDER — LORAZEPAM 1 MG/1
1 TABLET ORAL EVERY 12 HOURS SCHEDULED
Status: COMPLETED | OUTPATIENT
Start: 2018-08-01 | End: 2018-08-02

## 2018-08-01 RX ADMIN — LORAZEPAM 1 MG: 1 TABLET ORAL at 08:47

## 2018-08-01 RX ADMIN — HYDROXYZINE HYDROCHLORIDE 50 MG: 50 TABLET, FILM COATED ORAL at 21:24

## 2018-08-01 RX ADMIN — NICOTINE POLACRILEX 2 MG: 2 GUM, CHEWING BUCCAL at 21:24

## 2018-08-01 RX ADMIN — Medication 1 TABLET: at 08:47

## 2018-08-01 RX ADMIN — SERTRALINE 50 MG: 50 TABLET, FILM COATED ORAL at 08:47

## 2018-08-01 RX ADMIN — IBUPROFEN 600 MG: 600 TABLET ORAL at 11:06

## 2018-08-01 RX ADMIN — IBUPROFEN 600 MG: 600 TABLET ORAL at 21:24

## 2018-08-01 RX ADMIN — THIAMINE HCL (VITAMIN B1) 50 MG TABLET 100 MG: at 08:47

## 2018-08-01 RX ADMIN — TRAZODONE HYDROCHLORIDE 50 MG: 50 TABLET ORAL at 22:21

## 2018-08-01 RX ADMIN — NICOTINE 1 PATCH: 21 PATCH TRANSDERMAL at 08:47

## 2018-08-01 RX ADMIN — LORAZEPAM 1 MG: 1 TABLET ORAL at 21:24

## 2018-08-01 RX ADMIN — QUETIAPINE FUMARATE 50 MG: 25 TABLET, FILM COATED ORAL at 21:24

## 2018-08-01 NOTE — PLAN OF CARE
Problem: Patient Care Overview  Goal: Plan of Care Review  Outcome: Ongoing (interventions implemented as appropriate)   08/01/18 0343   Coping/Psychosocial   Plan of Care Reviewed With patient   Plan of Care Review   Progress no change   OTHER   Outcome Summary PT RATED ANXIETY & DEPRESSION BOTH 5, DENIED SI/HI/HALLUCINATIONS, NO WITHDRAWLS CRAVING 5.       Problem: Overarching Goals (Adult)  Goal: Adheres to Safety Considerations for Self and Others  Outcome: Ongoing (interventions implemented as appropriate)    Goal: Optimized Coping Skills in Response to Life Stressors  Outcome: Ongoing (interventions implemented as appropriate)    Goal: Develops/Participates in Therapeutic [place] to Support Successful Transition  Outcome: Ongoing (interventions implemented as appropriate)

## 2018-08-01 NOTE — PROGRESS NOTES
[facility] is scheduled to pick patient up at 8:30am on Friday, August 3.    [facility]: Patient is being discharged on Friday, August 3 2018.

## 2018-08-01 NOTE — PLAN OF CARE
Problem: Patient Care Overview  Goal: Interprofessional Rounds/Family Conf  Outcome: Ongoing (interventions implemented as appropriate)   08/01/18 1412   Interdisciplinary Rounds/Family Conf   Summary Treatment team staffing    Interdisciplinary Rounds/Family Conf   Participants psychiatrist;social work;patient       1400:     Therapist continues to staff with treatment team and assess patient daily.  Met with patient individually.  Patient reports that he remains motivated to enter rehab.  Patient has been referred to [facility] on Friday and needs to be there by 1030 a.m. Therapist has staffed with navigramana Saldana and patient can ride [facility].  Today, patient remains calm and cooperative.  He appears positive regarding residential treatment.  Patient reports continued night terrors and mood disturbance. He plans to talk with Dr. Foley about taking Effexor as this has helped him in the past. Therapist continues to provide emotional support. Therapist praised patient for agreeing to residential treatment.

## 2018-08-01 NOTE — PROGRESS NOTES
"INPATIENT PSYCHIATRIC PROGRESS NOTE    Name:  Mayelin Flower  :  1980  MRN:  [id number]  Visit Number:  [id number]  Length of stay:  2    Behavioral Health Treatment Plan and Problem List: I have reviewed and approved the Behavioral Health Treatment Plan and Problem list.    SUBJECTIVE  CC: \"Doing ok\"     INTERVAL HISTORY: Depressed improving, no psychotic determinants, minimal withdrawal symptoms today.,  Complains of fluid feeling in his left ear, on exam seems to be wax in the external canal both sides.     Depression rating 7/10  Anxiety rating 9/10  Sleep: good    Cravin/10       Review of Systems   Respiratory: Negative.    Cardiovascular: Negative.    Gastrointestinal: Negative.          OBJECTIVE    Temp:  [97.1 °F (36.2 °C)-98.2 °F (36.8 °C)] 98.2 °F (36.8 °C)  Heart Rate:  [55-84] 84  Resp:  [16-18] 18  BP: (106-126)/(52-69) 126/69    MENTAL STATUS EXAM:      Appearance:Casually dressed, good hygeine.   Cooperation:Cooperative  Psychomotor: No psychomotor agitation/retardation, No EPS, No motor tics  Speech-normal rate, amount.   Mood/Affect: Depressed  Thought Processes: associations intact  Thought Content: negativistic   Hallucination(s): none  Hopelessness: No  Optimistic:minimally  Suicidal Thoughts:  none  Suicidal Plan/Intent: none  Homicidal Thoughts:  absent  Orientation: oriented x 3  Memory: recent intact    Lab Results (last 24 hours)     ** No results found for the last 24 hours. **           Imaging Results (last 24 hours)     ** No results found for the last 24 hours. **           ECG/EMG Results (most recent)     Procedure Component Value Units Date/Time    ECG 12 Lead [[id number]] Collected:  18 1655     Updated:  18 1036    Narrative:       Test Reason : Potential adverse reaction to medications.  Blood Pressure : **/** mmHG  Vent. Rate : 062 BPM     Atrial Rate : 062 BPM     P-R Int : 124 ms          QRS Dur : 096 ms      QT Int : 426 ms       P-R-T Axes : 042 069 " 060 degrees     QTc Int : 432 ms    Normal sinus rhythm  Normal ECG  No previous ECGs available  Confirmed by Dhruv Barnes (Denise) on 7/31/2018 10:36:09 AM    Referred By:  GE           Confirmed By:Dhruv Barnes           ALLERGIES: Patient has no known allergies.      Current Facility-Administered Medications:   •  aluminum-magnesium hydroxide-simethicone (MAALOX MAX) 400-400-40 MG/5ML suspension 15 mL, 15 mL, Oral, Q6H PRN, Julius Cisneros MD  •  benzonatate (TESSALON) capsule 100 mg, 100 mg, Oral, TID PRN, Julius Cisneros MD  •  benztropine (COGENTIN) tablet 1 mg, 1 mg, Oral, Daily PRN **OR** benztropine (COGENTIN) injection 0.5 mg, 0.5 mg, Intramuscular, Daily PRN, Julius Cisneros MD  •  famotidine (PEPCID) tablet 20 mg, 20 mg, Oral, BID PRN, Julius Cisneros MD  •  hydrOXYzine (ATARAX) tablet 50 mg, 50 mg, Oral, Q6H PRN, Julius Cisneros MD  •  ibuprofen (ADVIL,MOTRIN) tablet 600 mg, 600 mg, Oral, Q6H PRN, Julius Cisneros MD, 600 mg at 08/01/18 1106  •  loperamide (IMODIUM) capsule 2 mg, 2 mg, Oral, 4x Daily PRN, Julius Cisneros MD  •  magnesium hydroxide (MILK OF MAGNESIA) suspension 2400 mg/10mL 10 mL, 10 mL, Oral, Daily PRN, Julius Cisneros MD  •  multivitamin (DAILY LEFTY) tablet 1 tablet, 1 tablet, Oral, Daily, Julius Cisneros MD, 1 tablet at 08/01/18 0847  •  nicotine (NICODERM CQ) 21 MG/24HR patch 1 patch, 1 patch, Transdermal, Q24H, Julius Cisneros MD, 1 patch at 08/01/18 0847  •  ondansetron (ZOFRAN) tablet 4 mg, 4 mg, Oral, Q6H PRN, Julius Cisneros MD  •  QUEtiapine (SEROquel) tablet 50 mg, 50 mg, Oral, Nightly, Anam Foley MD, 50 mg at 07/31/18 2128  •  sertraline (ZOLOFT) tablet 50 mg, 50 mg, Oral, Daily, Anam Foley MD, 50 mg at 08/01/18 0847  •  sodium chloride (OCEAN) nasal spray 2 spray, 2 spray, Each Nare, PRN, Julius Cisneros MD  •  traZODone (DESYREL) tablet 50 mg, 50 mg, Oral, Nightly  PRN, Julius Cisneros MD, 50 mg at 07/30/18 2116  •  vitamin B-1 tablet 100 mg, 100 mg, Oral, Daily, Julius Cisneros MD, 100 mg at 08/01/18 0847    ASSESSMENT & PLAN    Patient Active Problem List   Diagnosis Code   • Major depression, recurrent (CMS/[facility]) F33.9 Plan: Changing to an SSRI venlafaxine per patient request plus provide for individual and group supportive psychotherapy   • Methamphetamine use disorder, severe, dependence (CMS/[facility]) F15.20 Plan: Will reintroduced initial recovery recovery principles in the patient's psychotherapeutic effort, Patient is planning to enter a residential chemical dependency treatment program post hospital.   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    Clinician:  Anam Foley MD  08/01/18  1:44 PM    Dictated utilizing Dragon dictation

## 2018-08-01 NOTE — PLAN OF CARE
Problem: Patient Care Overview  Goal: Plan of Care Review  Outcome: Ongoing (interventions implemented as appropriate)   08/01/18 1520   Coping/Psychosocial   Plan of Care Reviewed With patient   Coping/Psychosocial   Patient Agreement with Plan of Care agrees   Plan of Care Review   Progress no change   OTHER   Outcome Summary Up and about. Rates anxiety and depression both 5. Denies si/hi. Calm and cooperative.       Problem: Overarching Goals (Adult)  Goal: Adheres to Safety Considerations for Self and Others  Outcome: Ongoing (interventions implemented as appropriate)    Goal: Optimized Coping Skills in Response to Life Stressors  Outcome: Ongoing (interventions implemented as appropriate)    Goal: Develops/Participates in Therapeutic [place] to Support Successful Transition  Outcome: Ongoing (interventions implemented as appropriate)

## 2018-08-02 VITALS
BODY MASS INDEX: 25.03 KG/M2 | HEART RATE: 90 BPM | HEIGHT: 72 IN | WEIGHT: 184.8 LBS | SYSTOLIC BLOOD PRESSURE: 137 MMHG | TEMPERATURE: 97 F | OXYGEN SATURATION: 96 % | DIASTOLIC BLOOD PRESSURE: 77 MMHG | RESPIRATION RATE: 18 BRPM

## 2018-08-02 PROBLEM — F32.A DEPRESSION WITH SUICIDAL IDEATION: Status: RESOLVED | Noted: 2018-07-30 | Resolved: 2018-08-02

## 2018-08-02 PROBLEM — R45.851 DEPRESSION WITH SUICIDAL IDEATION: Status: RESOLVED | Noted: 2018-07-30 | Resolved: 2018-08-02

## 2018-08-02 PROCEDURE — 99232 SBSQ HOSP IP/OBS MODERATE 35: CPT | Performed by: PSYCHIATRY & NEUROLOGY

## 2018-08-02 RX ORDER — VENLAFAXINE 37.5 MG/1
75 TABLET ORAL DAILY
Qty: 60 TABLET | Refills: 0 | Status: SHIPPED | OUTPATIENT
Start: 2018-08-02 | End: 2019-05-15 | Stop reason: SDUPTHER

## 2018-08-02 RX ORDER — QUETIAPINE FUMARATE 50 MG/1
50 TABLET, FILM COATED ORAL NIGHTLY
Qty: 30 TABLET | Refills: 0 | Status: SHIPPED | OUTPATIENT
Start: 2018-08-02 | End: 2019-05-15 | Stop reason: SDUPTHER

## 2018-08-02 RX ADMIN — QUETIAPINE FUMARATE 50 MG: 25 TABLET, FILM COATED ORAL at 20:44

## 2018-08-02 RX ADMIN — NICOTINE POLACRILEX 2 MG: 2 GUM, CHEWING BUCCAL at 10:12

## 2018-08-02 RX ADMIN — NICOTINE POLACRILEX 2 MG: 2 GUM, CHEWING BUCCAL at 20:44

## 2018-08-02 RX ADMIN — NICOTINE POLACRILEX 2 MG: 2 GUM, CHEWING BUCCAL at 18:27

## 2018-08-02 RX ADMIN — NICOTINE POLACRILEX 2 MG: 2 GUM, CHEWING BUCCAL at 08:37

## 2018-08-02 RX ADMIN — Medication 10 DROP: at 20:44

## 2018-08-02 RX ADMIN — Medication 1 TABLET: at 08:37

## 2018-08-02 RX ADMIN — NICOTINE POLACRILEX 2 MG: 2 GUM, CHEWING BUCCAL at 14:28

## 2018-08-02 RX ADMIN — VENLAFAXINE HYDROCHLORIDE 75 MG: 75 CAPSULE, EXTENDED RELEASE ORAL at 08:37

## 2018-08-02 RX ADMIN — HYDROXYZINE HYDROCHLORIDE 50 MG: 50 TABLET, FILM COATED ORAL at 20:44

## 2018-08-02 RX ADMIN — NICOTINE POLACRILEX 2 MG: 2 GUM, CHEWING BUCCAL at 12:09

## 2018-08-02 RX ADMIN — NICOTINE POLACRILEX 2 MG: 2 GUM, CHEWING BUCCAL at 16:40

## 2018-08-02 RX ADMIN — IBUPROFEN 600 MG: 600 TABLET ORAL at 08:37

## 2018-08-02 RX ADMIN — Medication 10 DROP: at 11:23

## 2018-08-02 RX ADMIN — THIAMINE HCL (VITAMIN B1) 50 MG TABLET 100 MG: at 08:37

## 2018-08-02 RX ADMIN — LORAZEPAM 1 MG: 1 TABLET ORAL at 08:37

## 2018-08-02 NOTE — PLAN OF CARE
Problem: Patient Care Overview  Goal: Plan of Care Review  Outcome: Ongoing (interventions implemented as appropriate)   08/02/18 7135   Coping/Psychosocial   Plan of Care Reviewed With patient   Coping/Psychosocial   Patient Agreement with Plan of Care agrees   Plan of Care Review   Progress no change   OTHER   Outcome Summary Up and about. Socializing with peers. Denies withdrawals-rates craving 10. Denies si/hi. Plan to discharge in am.       Problem: Overarching Goals (Adult)  Goal: Adheres to Safety Considerations for Self and Others  Outcome: Ongoing (interventions implemented as appropriate)    Goal: Optimized Coping Skills in Response to Life Stressors  Outcome: Ongoing (interventions implemented as appropriate)    Goal: Develops/Participates in Therapeutic [place] to Support Successful Transition  Outcome: Ongoing (interventions implemented as appropriate)

## 2018-08-02 NOTE — PROGRESS NOTES
0959:     DATA:      Therapist met individually with patient this date. Discussed progress in treatment and any needs/concerns. Reviewed plan for him to enter [facility] on Friday. Patient agreeable. Patient plans to meet with therapist again this afternoon to call his mother.  He plans to ask her to bring his clothes for rehab.  Patient denies other needs this date. Therapist continues to offer emotional support.  Praised patient for accepting residential referral.      1323: Assisted patient with calling his mother. He asked her to bring his belongings to hospital and alerted her of his plans to be transferred to [facility] tomorrow. She was agreeable.       ASSESSMENT:     Patient seen for follow up of Major Depression, Methamphetamine Use Disorder, and Alcohol Dependence mood disorder.  Patient also impacted by PTSD. Patient observed to have depressed affect and congruent mood. Patient does report feeling some better. He remains goal directed and motivated for rehab. Patient lists is children has protective factors and motivation. Patient's thought content appears normal. He appears oriented x 4 and stable to enter residential treatment tomorrow.  Safest plan would be to continue hospitalization today so that he can enter rehab directly tomorrow.      Plan:    Patient to enter [facility] tomorrow. [facility] scheduled for 0830 a.m.  Patient agreeable. Patient's discharge medications have already been filled and placed in the medication room.

## 2018-08-02 NOTE — PROGRESS NOTES
"INPATIENT PSYCHIATRIC PROGRESS NOTE    Name:  Mayelin Flower  :  1980  MRN:  [id number]  Visit Number:  [id number]  Length of stay:  3    Behavioral Health Treatment Plan and Problem List: I have reviewed and approved the Behavioral Health Treatment Plan and Problem list.    SUBJECTIVE  CC: \"Some better\".     INTERVAL HISTORY: Remains moderately depressed without suicidal ideation or death wishes.  Has a trace of hope with optimism for better future, continues to be committed to the transition to residential treatment as planned tomorrow.    Depression rating 7/10  Anxiety rating 8/10  Sleep: 7-8 hours, no nightmares last night  Withdrawal sx: irritable  Cravin-6/10       Review of Systems   Respiratory: Negative.    Cardiovascular: Negative.    Gastrointestinal: Negative.    Neurological: Negative.    Continues to have mild discomfort with sensation of fluid in his left ear, both external canals are blocked with cerumen.      OBJECTIVE    Temp:  [98.2 °F (36.8 °C)] 98.2 °F (36.8 °C)  Heart Rate:  [84-93] 93  Resp:  [18] 18  BP: (124-126)/(69-70) 124/70    MENTAL STATUS EXAM:      Appearance:Casually dressed, good hygeine.   Cooperation:Cooperative  Psychomotor: No psychomotor agitation/retardation, No EPS, No motor tics  Speech-normal rate, amount.   Mood/Affect: Depressed  Thought Processes: associations intact  Thought Content: negativistic   Hallucination(s): none  Hopelessness: No  Optimistic:minimally  Suicidal Thoughts:  none  Suicidal Plan/Intent: none  Homicidal Thoughts:  absent  Orientation: oriented x 3  Memory: recent intact    Lab Results (last 24 hours)     ** No results found for the last 24 hours. **           Imaging Results (last 24 hours)     ** No results found for the last 24 hours. **           ECG/EMG Results (most recent)     Procedure Component Value Units Date/Time    ECG 12 Lead [[id number]] Collected:  18 1655     Updated:  18 1036    Narrative:       Test Reason : " Potential adverse reaction to medications.  Blood Pressure : **/** mmHG  Vent. Rate : 062 BPM     Atrial Rate : 062 BPM     P-R Int : 124 ms          QRS Dur : 096 ms      QT Int : 426 ms       P-R-T Axes : 042 069 060 degrees     QTc Int : 432 ms    Normal sinus rhythm  Normal ECG  No previous ECGs available  Confirmed by Dhruv Barnes ([id number]) on 7/31/2018 10:36:09 AM    Referred By:  GE           Confirmed By:Dhruv Barnes           ALLERGIES: Patient has no known allergies.      Current Facility-Administered Medications:   •  aluminum-magnesium hydroxide-simethicone (MAALOX MAX) 400-400-40 MG/5ML suspension 15 mL, 15 mL, Oral, Q6H PRN, Julius Cisneros MD  •  benzonatate (TESSALON) capsule 100 mg, 100 mg, Oral, TID PRN, Julius Cisneros MD  •  benztropine (COGENTIN) tablet 1 mg, 1 mg, Oral, Daily PRN **OR** benztropine (COGENTIN) injection 0.5 mg, 0.5 mg, Intramuscular, Daily PRN, Julius Cisneros MD  •  famotidine (PEPCID) tablet 20 mg, 20 mg, Oral, BID PRN, Julius Cisneros MD  •  hydrOXYzine (ATARAX) tablet 50 mg, 50 mg, Oral, Q6H PRN, Julius Cisneros MD, 50 mg at 08/01/18 2124  •  ibuprofen (ADVIL,MOTRIN) tablet 600 mg, 600 mg, Oral, Q6H PRN, Julius Cisneros MD, 600 mg at 08/02/18 0837  •  loperamide (IMODIUM) capsule 2 mg, 2 mg, Oral, 4x Daily PRN, Julius Cisneros MD  •  magnesium hydroxide (MILK OF MAGNESIA) suspension 2400 mg/10mL 10 mL, 10 mL, Oral, Daily PRN, Julius Cisneros MD  •  multivitamin (DAILY LEFTY) tablet 1 tablet, 1 tablet, Oral, Daily, Julius Cisneros MD, 1 tablet at 08/02/18 0837  •  nicotine polacrilex (NICORETTE) gum 2 mg, 2 mg, Mouth/Throat, Q2H PRN, Anam Foley MD, 2 mg at 08/02/18 0837  •  ondansetron (ZOFRAN) tablet 4 mg, 4 mg, Oral, Q6H PRN, Julius Cisneros MD  •  QUEtiapine (SEROquel) tablet 50 mg, 50 mg, Oral, Nightly, Anam Foley MD, 50 mg at 08/01/18 2124  •  sodium chloride  (OCEAN) nasal spray 2 spray, 2 spray, Each Nare, PRN, Julius Cisneros MD  •  traZODone (DESYREL) tablet 50 mg, 50 mg, Oral, Nightly PRN, Julius Cisneros MD, 50 mg at 08/01/18 2221  •  venlafaxine XR (EFFEXOR-XR) 24 hr capsule 75 mg, 75 mg, Oral, Daily With Breakfast, Anam Foley MD, 75 mg at 08/02/18 0837  •  vitamin B-1 tablet 100 mg, 100 mg, Oral, Daily, Julius Cisneros MD, 100 mg at 08/02/18 0837    ASSESSMENT & PLAN    Patient Active Problem List   Diagnosis   • Major depression, recurrent (CMS/HCC)   • Methamphetamine use disorder, severe, dependence (CMS/HCC)   • 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    Clinician:  Anam Foley MD  08/02/18  9:34 AM    Dictated utilizing Dragon dictation

## 2018-08-02 NOTE — PLAN OF CARE
Problem: Patient Care Overview  Goal: Plan of Care Review  Outcome: Ongoing (interventions implemented as appropriate)  Patient reports eating well; difficulty staying asleep; anxiety 6; depression 9; denies SI/HI/AVH; alert and oriented; states meds helping; rates craving 6; withdrawal s/s irritable, body cramps, anxiety; calm; cooperative; no complaints voiced.   08/02/18 0050   Coping/Psychosocial   Plan of Care Reviewed With patient   Coping/Psychosocial   Patient Agreement with Plan of Care agrees   Plan of Care Review   Progress improving

## 2018-08-03 PROCEDURE — 99239 HOSP IP/OBS DSCHRG MGMT >30: CPT | Performed by: PSYCHIATRY & NEUROLOGY

## 2018-08-03 RX ADMIN — VENLAFAXINE HYDROCHLORIDE 75 MG: 75 CAPSULE, EXTENDED RELEASE ORAL at 07:59

## 2018-08-03 RX ADMIN — NICOTINE POLACRILEX 2 MG: 2 GUM, CHEWING BUCCAL at 07:27

## 2018-08-03 NOTE — DISCHARGE SUMMARY
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  Routine labs were checked.  Patient was assigned a masters level therapist and provided with an opportunity to participate in group and individual therapy on the unit.  Patient seen on a daily basis for evaluation and supportive therapy.  Patient initially started on Zoloft and Seroquel subsequently changed to venlafaxine per his testimonial of prior effectiveness while continuing the 50 mg of Seroquel at bedtime.  Patient's depression certainly improved during his hospital stay, patient was free of any thoughts of harming self or others or any psychotic determinants at discharge planning to enter the [facility] chemical dependency residential treatment program date of discharge from the hospital.  See below for medications.    Consults:   Consults     No orders found from 7/1/2018 to 7/31/2018.          Labs:  Lab Results (all)     None          Imaging:  Imaging Results (all)     None                  Condition on Discharge:  improved    Prognosis: fair    Vital Signs  Temp:  [97 °F (36.1 °C)-98.1 °F (36.7 °C)] 97 °F (36.1 °C)  Heart Rate:  [61-90] 90  Resp:  [18] 18  BP: ()/(52-77) 137/77    Discharge Disposition  Rehab Facility or Unit (DC - External)    Discharge  Medications     Discharge Medications      New Medications      Instructions Start Date   QUEtiapine 50 MG tablet  Commonly known as:  SEROquel   50 mg, Oral, Nightly      venlafaxine 37.5 MG tablet  Commonly known as:  EFFEXOR   75 mg, Oral, Daily             Discharge Diet:   Diet Instructions     Resume normal diet as tolerated.                 Activity at Discharge:   Activity Instructions     Resume normal activity as tolerated.                 Follow-up Appointments: Patient to be admitted to the [facility] chemical dependency treatment program later today.          Anam Foley MD  08/03/18  7:25 AM  Time spent with the discharge process >30 minutes.     Dictated utilizing Dragon dictation

## 2018-08-03 NOTE — PLAN OF CARE
Problem: Patient Care Overview  Goal: Plan of Care Review  Outcome: Ongoing (interventions implemented as appropriate)   08/03/18 0151   Coping/Psychosocial   Plan of Care Reviewed With patient   Coping/Psychosocial   Patient Agreement with Plan of Care agrees   Plan of Care Review   Progress no change   OTHER   Outcome Summary Pt rates anxiety 8/10 depression 6/10. Denies SI HI hallucinations. Rates craving 8/10 denies any w/d symptoms.       Problem: Overarching Goals (Adult)  Goal: Adheres to Safety Considerations for Self and Others  Outcome: Ongoing (interventions implemented as appropriate)    Goal: Optimized Coping Skills in Response to Life Stressors  Outcome: Ongoing (interventions implemented as appropriate)    Goal: Develops/Participates in Therapeutic [place] to Support Successful Transition  Outcome: Ongoing (interventions implemented as appropriate)

## 2019-05-13 ENCOUNTER — OFFICE VISIT (OUTPATIENT)
Dept: PSYCHIATRY | Facility: CLINIC | Age: 39
End: 2019-05-13

## 2019-05-13 VITALS
BODY MASS INDEX: 24 KG/M2 | SYSTOLIC BLOOD PRESSURE: 108 MMHG | HEART RATE: 80 BPM | HEIGHT: 72 IN | WEIGHT: 177.2 LBS | DIASTOLIC BLOOD PRESSURE: 73 MMHG

## 2019-05-13 DIAGNOSIS — F15.20 METHAMPHETAMINE USE DISORDER, SEVERE, DEPENDENCE (HCC): Primary | ICD-10-CM

## 2019-05-13 DIAGNOSIS — Z79.899 MEDICATION MANAGEMENT: ICD-10-CM

## 2019-05-13 DIAGNOSIS — F33.1 MODERATE EPISODE OF RECURRENT MAJOR DEPRESSIVE DISORDER (HCC): ICD-10-CM

## 2019-05-13 DIAGNOSIS — F43.10 POST TRAUMATIC STRESS DISORDER (PTSD): ICD-10-CM

## 2019-05-13 DIAGNOSIS — F41.1 GENERALIZED ANXIETY DISORDER: ICD-10-CM

## 2019-05-13 LAB
AMPHETAMINE CUT-OFF: ABNORMAL
BENZODIAZIPINE CUT-OFF: ABNORMAL
BUPRENORPHINE CUT-OFF: ABNORMAL
COCAINE CUT-OFF: ABNORMAL
EXTERNAL AMPHETAMINE SCREEN URINE: POSITIVE
EXTERNAL BENZODIAZEPINE SCREEN URINE: NEGATIVE
EXTERNAL BUPRENORPHINE SCREEN URINE: NEGATIVE
EXTERNAL COCAINE SCREEN URINE: NEGATIVE
EXTERNAL MDMA: NEGATIVE
EXTERNAL METHADONE SCREEN URINE: NEGATIVE
EXTERNAL METHAMPHETAMINE SCREEN URINE: POSITIVE
EXTERNAL OPIATES SCREEN URINE: NEGATIVE
EXTERNAL OXYCODONE SCREEN URINE: NEGATIVE
EXTERNAL THC SCREEN URINE: NEGATIVE
MDMA CUT-OFF: ABNORMAL
METHADONE CUT-OFF: ABNORMAL
METHAMPHETAMINE CUT-OFF: ABNORMAL
OPIATES CUT-OFF: ABNORMAL
OXYCODONE CUT-OFF: ABNORMAL
THC CUT-OFF: ABNORMAL

## 2019-05-13 PROCEDURE — 90791 PSYCH DIAGNOSTIC EVALUATION: CPT | Performed by: SOCIAL WORKER
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
## 2019-05-15 ENCOUNTER — OFFICE VISIT (OUTPATIENT)
Dept: PSYCHIATRY | Facility: HOSPITAL | Age: 39
End: 2019-05-15

## 2019-05-15 ENCOUNTER — LAB (OUTPATIENT)
Dept: LAB | Facility: HOSPITAL | Age: 39
End: 2019-05-15

## 2019-05-15 DIAGNOSIS — Z79.899 LONG TERM USE OF DRUG: ICD-10-CM

## 2019-05-15 DIAGNOSIS — F41.1 GENERALIZED ANXIETY DISORDER: ICD-10-CM

## 2019-05-15 DIAGNOSIS — F33.1 MODERATE EPISODE OF RECURRENT MAJOR DEPRESSIVE DISORDER (HCC): ICD-10-CM

## 2019-05-15 DIAGNOSIS — F43.10 POST TRAUMATIC STRESS DISORDER (PTSD): ICD-10-CM

## 2019-05-15 DIAGNOSIS — Z79.899 MEDICATION MANAGEMENT: ICD-10-CM

## 2019-05-15 DIAGNOSIS — Z72.0 TOBACCO USE: ICD-10-CM

## 2019-05-15 DIAGNOSIS — F33.1 MAJOR DEPRESSIVE DISORDER, RECURRENT EPISODE, MODERATE (HCC): ICD-10-CM

## 2019-05-15 DIAGNOSIS — F43.10 POSTTRAUMATIC STRESS DISORDER: ICD-10-CM

## 2019-05-15 DIAGNOSIS — F15.20 AMPHETAMINE DEPENDENCE (HCC): Primary | ICD-10-CM

## 2019-05-15 DIAGNOSIS — F10.11 H/O ETOH ABUSE: ICD-10-CM

## 2019-05-15 DIAGNOSIS — F15.20 AMPHETAMINE DEPENDENCE (HCC): ICD-10-CM

## 2019-05-15 DIAGNOSIS — F15.20 METHAMPHETAMINE ADDICTION (HCC): ICD-10-CM

## 2019-05-15 DIAGNOSIS — F15.20 METHAMPHETAMINE ADDICTION (HCC): Primary | ICD-10-CM

## 2019-05-15 DIAGNOSIS — F15.20 METHAMPHETAMINE USE DISORDER, SEVERE, DEPENDENCE (HCC): Primary | ICD-10-CM

## 2019-05-15 DIAGNOSIS — F19.10 SUBSTANCE ABUSE (HCC): ICD-10-CM

## 2019-05-15 PROCEDURE — H0015 ALCOHOL AND/OR DRUG SERVICES: HCPCS | Performed by: COUNSELOR

## 2019-05-15 PROCEDURE — G0483 DRUG TEST DEF 22+ CLASSES: HCPCS

## 2019-05-15 PROCEDURE — 80307 DRUG TEST PRSMV CHEM ANLYZR: CPT

## 2019-05-15 PROCEDURE — 90792 PSYCH DIAG EVAL W/MED SRVCS: CPT | Performed by: NURSE PRACTITIONER

## 2019-05-15 RX ORDER — VENLAFAXINE 37.5 MG/1
75 TABLET ORAL DAILY
Qty: 30 TABLET | Refills: 0 | Status: SHIPPED | OUTPATIENT
Start: 2019-05-15 | End: 2019-05-15 | Stop reason: SDUPTHER

## 2019-05-15 RX ORDER — VENLAFAXINE 37.5 MG/1
75 TABLET ORAL DAILY
Qty: 30 TABLET | Refills: 0 | Status: SHIPPED | OUTPATIENT
Start: 2019-05-15 | End: 2019-06-11

## 2019-05-15 RX ORDER — QUETIAPINE FUMARATE 50 MG/1
50 TABLET, FILM COATED ORAL NIGHTLY
Qty: 30 TABLET | Refills: 0 | Status: SHIPPED | OUTPATIENT
Start: 2019-05-15 | End: 2019-05-15 | Stop reason: SDUPTHER

## 2019-05-15 RX ORDER — PRAZOSIN HYDROCHLORIDE 2 MG/1
2-4 CAPSULE ORAL NIGHTLY
Qty: 60 CAPSULE | Refills: 0 | Status: SHIPPED | OUTPATIENT
Start: 2019-05-15 | End: 2019-05-15 | Stop reason: SDUPTHER

## 2019-05-15 RX ORDER — PRAZOSIN HYDROCHLORIDE 2 MG/1
2-4 CAPSULE ORAL NIGHTLY
Qty: 60 CAPSULE | Refills: 0 | Status: SHIPPED | OUTPATIENT
Start: 2019-05-15 | End: 2019-06-11

## 2019-05-15 RX ORDER — QUETIAPINE FUMARATE 50 MG/1
50 TABLET, FILM COATED ORAL NIGHTLY
Qty: 30 TABLET | Refills: 0 | Status: SHIPPED | OUTPATIENT
Start: 2019-05-15 | End: 2019-06-11 | Stop reason: SDUPTHER

## 2019-05-16 ENCOUNTER — LAB (OUTPATIENT)
Dept: LAB | Facility: HOSPITAL | Age: 39
End: 2019-05-16

## 2019-05-16 ENCOUNTER — OFFICE VISIT (OUTPATIENT)
Dept: PSYCHIATRY | Facility: HOSPITAL | Age: 39
End: 2019-05-16

## 2019-05-16 ENCOUNTER — SPECIALTY PHARMACY (OUTPATIENT)
Dept: PHARMACY | Facility: HOSPITAL | Age: 39
End: 2019-05-16

## 2019-05-16 DIAGNOSIS — F15.20 METHAMPHETAMINE USE DISORDER, SEVERE, DEPENDENCE (HCC): ICD-10-CM

## 2019-05-16 DIAGNOSIS — F33.1 MAJOR DEPRESSIVE DISORDER, RECURRENT EPISODE, MODERATE (HCC): ICD-10-CM

## 2019-05-16 DIAGNOSIS — F43.10 POSTTRAUMATIC STRESS DISORDER: ICD-10-CM

## 2019-05-16 DIAGNOSIS — F43.10 POST TRAUMATIC STRESS DISORDER (PTSD): ICD-10-CM

## 2019-05-16 DIAGNOSIS — F19.10 SUBSTANCE ABUSE (HCC): ICD-10-CM

## 2019-05-16 DIAGNOSIS — F15.20 METHAMPHETAMINE ADDICTION (HCC): ICD-10-CM

## 2019-05-16 DIAGNOSIS — Z79.899 LONG TERM USE OF DRUG: ICD-10-CM

## 2019-05-16 DIAGNOSIS — F10.24 ALCOHOL DEPENDENCE WITH ALCOHOL-INDUCED MOOD DISORDER (HCC): ICD-10-CM

## 2019-05-16 DIAGNOSIS — F15.20 METHAMPHETAMINE ADDICTION (HCC): Primary | ICD-10-CM

## 2019-05-16 LAB
ALBUMIN SERPL-MCNC: 4 G/DL (ref 3.5–5.2)
ALP SERPL-CCNC: 72 U/L (ref 39–117)
ALT SERPL W P-5'-P-CCNC: 47 U/L (ref 1–41)
ANION GAP SERPL CALCULATED.3IONS-SCNC: 13 MMOL/L
AST SERPL-CCNC: 36 U/L (ref 1–40)
BASOPHILS # BLD AUTO: 0.09 10*3/MM3 (ref 0–0.2)
BASOPHILS NFR BLD AUTO: 1.3 % (ref 0–1.5)
BILIRUB CONJ SERPL-MCNC: <0.2 MG/DL (ref 0.2–0.3)
BILIRUB INDIRECT SERPL-MCNC: ABNORMAL MG/DL
BILIRUB SERPL-MCNC: 0.2 MG/DL (ref 0.2–1.2)
BUN BLD-MCNC: 7 MG/DL (ref 6–20)
BUN/CREAT SERPL: 8.9 (ref 7–25)
CALCIUM SPEC-SCNC: 9.3 MG/DL (ref 8.6–10.5)
CHLORIDE SERPL-SCNC: 103 MMOL/L (ref 98–107)
CHOLEST SERPL-MCNC: 141 MG/DL (ref 0–200)
CO2 SERPL-SCNC: 27 MMOL/L (ref 22–29)
CREAT BLD-MCNC: 0.79 MG/DL (ref 0.76–1.27)
DEPRECATED RDW RBC AUTO: 44.4 FL (ref 37–54)
EOSINOPHIL # BLD AUTO: 0.7 10*3/MM3 (ref 0–0.4)
EOSINOPHIL NFR BLD AUTO: 10 % (ref 0.3–6.2)
ERYTHROCYTE [DISTWIDTH] IN BLOOD BY AUTOMATED COUNT: 12.6 % (ref 12.3–15.4)
GFR SERPL CREATININE-BSD FRML MDRD: 110 ML/MIN/1.73
GLUCOSE BLD-MCNC: 67 MG/DL (ref 65–99)
HAV IGM SERPL QL IA: NORMAL
HBA1C MFR BLD: 5.68 % (ref 4.8–5.6)
HBV CORE IGM SERPL QL IA: NORMAL
HBV SURFACE AG SERPL QL IA: NORMAL
HCT VFR BLD AUTO: 47.2 % (ref 37.5–51)
HCV AB SER DONR QL: NORMAL
HDLC SERPL-MCNC: 45 MG/DL (ref 40–60)
HGB BLD-MCNC: 14.7 G/DL (ref 13–17.7)
IMM GRANULOCYTES # BLD AUTO: 0.03 10*3/MM3 (ref 0–0.05)
IMM GRANULOCYTES NFR BLD AUTO: 0.4 % (ref 0–0.5)
LDLC SERPL CALC-MCNC: 65 MG/DL (ref 0–100)
LDLC/HDLC SERPL: 1.44 {RATIO}
LYMPHOCYTES # BLD AUTO: 1.61 10*3/MM3 (ref 0.7–3.1)
LYMPHOCYTES NFR BLD AUTO: 22.9 % (ref 19.6–45.3)
MCH RBC QN AUTO: 29.6 PG (ref 26.6–33)
MCHC RBC AUTO-ENTMCNC: 31.1 G/DL (ref 31.5–35.7)
MCV RBC AUTO: 95.2 FL (ref 79–97)
MONOCYTES # BLD AUTO: 0.69 10*3/MM3 (ref 0.1–0.9)
MONOCYTES NFR BLD AUTO: 9.8 % (ref 5–12)
NEUTROPHILS # BLD AUTO: 3.91 10*3/MM3 (ref 1.7–7)
NEUTROPHILS NFR BLD AUTO: 55.6 % (ref 42.7–76)
NRBC BLD AUTO-RTO: 0 /100 WBC (ref 0–0.2)
PLATELET # BLD AUTO: 334 10*3/MM3 (ref 140–450)
PMV BLD AUTO: 10.4 FL (ref 6–12)
POTASSIUM BLD-SCNC: 4.8 MMOL/L (ref 3.5–5.2)
PROT SERPL-MCNC: 6.9 G/DL (ref 6–8.5)
RBC # BLD AUTO: 4.96 10*6/MM3 (ref 4.14–5.8)
SODIUM BLD-SCNC: 143 MMOL/L (ref 136–145)
T4 FREE SERPL-MCNC: 1.02 NG/DL (ref 0.93–1.7)
TRIGL SERPL-MCNC: 156 MG/DL (ref 0–150)
TSH SERPL DL<=0.05 MIU/L-ACNC: 1.08 MIU/ML (ref 0.27–4.2)
VLDLC SERPL-MCNC: 31.2 MG/DL (ref 5–40)
WBC NRBC COR # BLD: 7.03 10*3/MM3 (ref 3.4–10.8)

## 2019-05-16 PROCEDURE — 80061 LIPID PANEL: CPT

## 2019-05-16 PROCEDURE — 80076 HEPATIC FUNCTION PANEL: CPT

## 2019-05-16 PROCEDURE — 84443 ASSAY THYROID STIM HORMONE: CPT

## 2019-05-16 PROCEDURE — 85025 COMPLETE CBC W/AUTO DIFF WBC: CPT

## 2019-05-16 PROCEDURE — 83036 HEMOGLOBIN GLYCOSYLATED A1C: CPT

## 2019-05-16 PROCEDURE — 80048 BASIC METABOLIC PNL TOTAL CA: CPT

## 2019-05-16 PROCEDURE — H0015 ALCOHOL AND/OR DRUG SERVICES: HCPCS | Performed by: COUNSELOR

## 2019-05-16 PROCEDURE — 36415 COLL VENOUS BLD VENIPUNCTURE: CPT

## 2019-05-16 PROCEDURE — 84439 ASSAY OF FREE THYROXINE: CPT

## 2019-05-16 PROCEDURE — 80074 ACUTE HEPATITIS PANEL: CPT

## 2019-05-16 RX ORDER — NALOXONE HYDROCHLORIDE 4 MG/.1ML
1 SPRAY NASAL AS NEEDED
Qty: 2 EACH | Refills: 0 | OUTPATIENT
Start: 2019-05-16 | End: 2019-10-26

## 2019-05-16 NOTE — PROGRESS NOTES
"NAME: Mayelin Flower  Date: 05/16/19  Phase I 8:30 am - 11:30 am    IOP GROUP NOTE    DATA:     3 hour IOP group therapy session (Check ins, Coping Skills )     Check Ins: Therapist facilitated discussion of the daily motivation passage from the “Daily Reflections” ([employer] (1991). Therapist continued facilitation of rapport building strategies between group members. Therapist asked that each patient check in with home life and recovery efforts and identify triggers, cravings, and high risk situations that arise between group sessions. Members discussed barriers and benefits of attending 12 step meetings. Therapist provided empathy and support during group meeting.      Coping Skills: Therapist facilitated group discussion regarding overcoming obstacles of addiction and focusing on positive recovery experience.  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  Processed and discussed.  Pharmacy staff provided Narcan training.    RESPONSE: Patient attended his second IOP session.  He reports he is feeling very tired because he has just recently stopped using meth.  He reports being frustrated because he was a little bit tardy today because of waiting on a ride.  He reports he does not like being late.  He reports he has a vehicle but it is not drivable.  He reports that he has had mild cravings and denies specific triggers.  He reports his biggest barrier currently is not having transportation.  He reported that he would not be in IOP session on Monday due to court appearance.     ASSESSMENT:     Lab Review  No visits with results within 1 Day(s) from this visit.   Latest known visit with results is:   Office Visit on 05/13/2019   Component Date Value Ref Range Status   • " External Amphetamine Screen Urine 05/13/2019 Positive   Final   • Amphetamine Cut-Off 05/13/2019 1000mg/ml   Final   • External Benzodiazepine Screen Uri* 05/13/2019 Negative   Final   • Benzodiazipine Cut-Off 05/13/2019 300mg/ml   Final   • External Cocaine Screen Urine 05/13/2019 Negative   Final   • Cocaine Cut-Off 05/13/2019 300mg/ml   Final   • External THC Screen Urine 05/13/2019 Negative   Final   • THC Cut-Off 05/13/2019 50mg/ml   Final   • External Methadone Screen Urine 05/13/2019 Negative   Final   • Methadone Cut-Off 05/13/2019 300mg/ml   Final   • External Methamphetamine Screen Ur* 05/13/2019 Positive   Final   • Methamphetamine Cut-Off 05/13/2019 1000mg/ml   Final   • External Oxycodone Screen Urine 05/13/2019 Negative   Final   • Oxycodone Cut-Off 05/13/2019 100mg/ml   Final   • External Buprenorphine Screen Urine 05/13/2019 Negative   Final   • Buprenorphine Cut-Off 05/13/2019 10mg/ml   Final   • External MDMA 05/13/2019 Negative   Final   • MDMA Cut-Off 05/13/2019 500mg/ml   Final   • External Opiates Screen Urine 05/13/2019 Negative   Final   • Opiates Cut-Off 05/13/2019 300mg/ml   Final       Mental Status Exam  Hygiene:  fair  Dress: casual  Attitude: cooperative and agreeable   Motor Activity: appropriate  Speech: regular rate and rhythm   Mood:  calm and conversant  Affect:  Full range  Thought Processes:  Goal directed  Thought Content:  Normal  Suicidal Thoughts:  denies  Homicidal Thoughts:  denies  Crisis Safety Plan: yes, to come to the emergency room.  Hallucinations:  denies  Reliability: fair  Insight: fair  Judgement: fair  Impulse Control: fair    Patient's Support Network Includes: The patient receives support from his extended family.    Progress toward goal: Not at goal    FUNCTIONING ASSESSMENT:  Community Living Skills: Impaired  Interpersonal Skills:  Impaired  Health and Physical  Within functional limits  Psychological State:  Within functional limits    Prognosis: Guarded  with Ongoing Treatment    Family issues related to recovery:  See family history report    12-Step attendance: not meeting minimum requirement to attend three 12 step meetings per week    Sponsor:  No    Identification of environmental and personal barriers to recovery: coping with limited emotions, remorse, guilt, work, family, overall understanding of disease of addiction     Motivation for treatment:  healthy lifestyle, long-term, recovery, and improving overall relationships    Impression/Formulation:      ICD-10-CM ICD-9-CM   1. Methamphetamine addiction (CMS/[facility]) F15.20 304.40   2. Posttraumatic stress disorder F43.10 309.81   3. Major depressive disorder, recurrent episode, moderate (CMS/[facility]) F33.1 296.32   4. Long term use of drug Z79.899 V58.69       CLINICAL MANUVERING/INTERVENTIONS: CBT and group interaction activites utilized to stress relapse recovery/prevention. Patient to actively participate in activity, engage verbally with peers and staff, display an appropriate affect, keep conversation appropriate to topic, and display no negative or impulsive behaviors. Member was encouraged to bring family members for educational group on Wednesdays and Thursday. Member was provided with encouragement and unconditional positive regard. Member was encouraged to continue participation with 12-step meetings and maintaining positive daily choices.         PLAN:  Continue [facility] Phase I.   Aftercare:  [facility] Phase II  Program Assignments:  Personal Journal, attendance of 12-step meetings, drug screens        This document signed by Bj Tee [facility]

## 2019-05-16 NOTE — PROGRESS NOTES
The patient presented to our facility today requesting naloxone.  The patient received verbal and written education on the following:   - Risk factors of opioid overdose  - Strategies to prevent opioid overdose  - Signs of opioid overdose  - Steps in responding to an overdose   - Information about naloxone  - Procedures for administering naloxone  - Proper storage and expiration of the naloxone product dispensed      Pursuant to the [place] Board of Pharmacy administrative regulation 201 MARTI 2:360, we will dispense:      Narcan® (naloxone HCl) 4mg/0.1mL nasal spray   Dispense #1 box (2 doses)    Sig: Call 911   Do not prime.  Spray into nostril upon signs of opioid overdose.     May repeat in 2-3 minutes in the opposite nostril if no or minimal breathing and  responsiveness, then as needed (if doses are available) every 2-3 minutes.      The signed protocol is kept in the [facility] [address]     The patient was given the opportunity to ask questions.  All questions were addressed.  The patient expressed understanding of the education provided.      Tere Merida RPH  05/16/19  11:39 AM

## 2019-05-17 DIAGNOSIS — F33.1 MODERATE EPISODE OF RECURRENT MAJOR DEPRESSIVE DISORDER (HCC): ICD-10-CM

## 2019-05-17 DIAGNOSIS — F10.24 ALCOHOL DEPENDENCE WITH ALCOHOL-INDUCED MOOD DISORDER (HCC): ICD-10-CM

## 2019-05-17 LAB — ETHANOL UR-MCNC: NEGATIVE %

## 2019-05-17 RX ORDER — NALTREXONE HYDROCHLORIDE 50 MG/1
50 TABLET, FILM COATED ORAL DAILY
Qty: 30 TABLET | Refills: 0 | Status: SHIPPED | OUTPATIENT
Start: 2019-05-17 | End: 2019-06-11 | Stop reason: SDUPTHER

## 2019-05-20 ENCOUNTER — APPOINTMENT (OUTPATIENT)
Dept: PSYCHIATRY | Facility: HOSPITAL | Age: 39
End: 2019-05-20

## 2019-05-23 LAB — CONV REPORT SUMMARY: NORMAL

## 2019-05-29 ENCOUNTER — APPOINTMENT (OUTPATIENT)
Dept: PSYCHIATRY | Facility: HOSPITAL | Age: 39
End: 2019-05-29

## 2019-05-29 ENCOUNTER — DOCUMENTATION (OUTPATIENT)
Dept: PSYCHIATRY | Facility: HOSPITAL | Age: 39
End: 2019-05-29

## 2019-05-29 NOTE — PROGRESS NOTES
[place] [address]  [phone]      NAME: Mayelin Flower  MRN: [id number]  CSN: [id number]  ATTENDING PHYSICIAN:Ying Jalloh M.D.  THERAPIST:Bj Tee [facility], [facility]  PRIMARY CARE PROVIDER: Yevgeniy Linares MD  DATE OF ADMISSION: 5/15/19  DATE OF DISCHARGE:529/19  YOB: 1980  REASON FOR ADMISSION: The patient was a voluntary admit to the [facility] program for assistance with METHAMPHETAMINE dependencies. The patient was referred to the [facility] program by the [facility].  SUMMARY OF CARE, TREATMENT, AND SERVICES PROVIDED: Patient began Phase 1 of the IOP program on 5/15/19 and attended 2 group therapy session. Mayelin was absent on 5 occasions prior to discharge. Mayelin did not complete any treatment goals due to non-compliance with recommended treatment plan. Mayelin was unable to identify and implement effective coping skills to address relapse, recovery, and sober supports.     INTENSIVE OUTPATIENT PROGRAM  DISCHARGE SUMMARY  NAME: Mayelin Flower  MRN: [id number]  CSN: [id number]  Page 1 of 2      [facility]  INTENSIVE OUTPATIENT PROGRAM  DISCHARGE SUMMARY  DISCHARGE RECOMMENDATIONS AND REFERRALS: Patient is DISCHARGED from the [facility] AGAINST MEDICAL ADVICE due to non-compliance with recommended treatments. Patient was directed to  follow up with PCP and Comp Care and seek treatment at a residential treatment center . Patient was encouraged to continue 12-step meetings and obtain a sponsor to work steps with to support her recovery efforts. Patient has an outpatient appointment at the [facility] with  No Follow-up on file. to address behavioral health concerns.  DISCHARGE MEDICATIONS:   Current Outpatient Medications   Medication Sig Dispense Refill   • naloxone (NARCAN) 4 MG/0.1ML nasal spray 1 spray into the nostril(s) as directed by provider As Needed (opioid overdose). Call 911.  Don't prime. Repeat in 2-3 min if necessary. 2 each 0   • naltrexone (DEPADE) 50 MG tablet Take 1 tablet by mouth Daily. 30 tablet 0   • Naltrexone (VIVITROL) 380 MG reconstituted suspension IM suspension Inject 380 mg into the appropriate muscle as directed by prescriber Every 28 (Twenty-Eight) Days. 1 each 1   • prazosin (MINIPRESS) 2 MG capsule Take 1-2 capsules by mouth Every Night. nightmares 60 capsule 0   • QUEtiapine (SEROquel) 50 MG tablet Take 1 tablet by mouth Every Night. 30 tablet 0   • venlafaxine (EFFEXOR) 37.5 MG tablet Take 2 tablets by mouth Daily. 30 tablet 0     No current facility-administered medications for this visit.      PROGNOSIS: poor without ongoing treatment  DISCHARGE DIAGNOSES:   No diagnosis found.    CC: Yevgeniy Linares MD      Physician Signature: ________________________________________  Therapist Signature: ________________________________________  Behavioral Health Director: __________________________________      INTENSIVE OUTPATIENT PROGRAM  DISCHARGE SUMMARY  NAME: Mayelin Flower  MRN: [id number]  CSN: [id number]  Page 2 of 2

## 2019-05-30 ENCOUNTER — APPOINTMENT (OUTPATIENT)
Dept: PSYCHIATRY | Facility: HOSPITAL | Age: 39
End: 2019-05-30

## 2019-06-03 ENCOUNTER — APPOINTMENT (OUTPATIENT)
Dept: PSYCHIATRY | Facility: HOSPITAL | Age: 39
End: 2019-06-03

## 2019-06-04 ENCOUNTER — APPOINTMENT (OUTPATIENT)
Dept: PSYCHIATRY | Facility: HOSPITAL | Age: 39
End: 2019-06-04

## 2019-06-05 ENCOUNTER — APPOINTMENT (OUTPATIENT)
Dept: PSYCHIATRY | Facility: HOSPITAL | Age: 39
End: 2019-06-05

## 2019-06-06 ENCOUNTER — APPOINTMENT (OUTPATIENT)
Dept: PSYCHIATRY | Facility: HOSPITAL | Age: 39
End: 2019-06-06

## 2019-06-07 RX ORDER — VENLAFAXINE 37.5 MG/1
TABLET ORAL
Qty: 30 TABLET | Refills: 0 | OUTPATIENT
Start: 2019-06-07

## 2019-06-10 RX ORDER — NALTREXONE HYDROCHLORIDE 50 MG/1
TABLET, FILM COATED ORAL
Qty: 30 TABLET | Refills: 0 | OUTPATIENT
Start: 2019-06-10

## 2019-06-10 RX ORDER — QUETIAPINE FUMARATE 50 MG/1
TABLET, FILM COATED ORAL
Qty: 30 TABLET | Refills: 0 | OUTPATIENT
Start: 2019-06-10

## 2019-06-10 RX ORDER — PRAZOSIN HYDROCHLORIDE 2 MG/1
CAPSULE ORAL
Qty: 60 CAPSULE | Refills: 0 | OUTPATIENT
Start: 2019-06-10

## 2019-06-11 ENCOUNTER — LAB (OUTPATIENT)
Dept: LAB | Facility: HOSPITAL | Age: 39
End: 2019-06-11

## 2019-06-11 ENCOUNTER — OFFICE VISIT (OUTPATIENT)
Dept: PSYCHIATRY | Facility: HOSPITAL | Age: 39
End: 2019-06-11

## 2019-06-11 DIAGNOSIS — F41.1 GENERALIZED ANXIETY DISORDER: ICD-10-CM

## 2019-06-11 DIAGNOSIS — Z72.0 TOBACCO USE: ICD-10-CM

## 2019-06-11 DIAGNOSIS — Z79.899 MEDICATION MANAGEMENT: ICD-10-CM

## 2019-06-11 DIAGNOSIS — F43.10 POST TRAUMATIC STRESS DISORDER (PTSD): ICD-10-CM

## 2019-06-11 DIAGNOSIS — F33.1 MODERATE EPISODE OF RECURRENT MAJOR DEPRESSIVE DISORDER (HCC): ICD-10-CM

## 2019-06-11 DIAGNOSIS — F15.20 AMPHETAMINE DEPENDENCE (HCC): ICD-10-CM

## 2019-06-11 DIAGNOSIS — F15.20 METHAMPHETAMINE USE DISORDER, SEVERE, DEPENDENCE (HCC): ICD-10-CM

## 2019-06-11 DIAGNOSIS — G47.9 SLEEPING DIFFICULTY: ICD-10-CM

## 2019-06-11 DIAGNOSIS — F15.20 METHAMPHETAMINE ADDICTION (HCC): ICD-10-CM

## 2019-06-11 DIAGNOSIS — F10.24 ALCOHOL DEPENDENCE WITH ALCOHOL-INDUCED MOOD DISORDER (HCC): Primary | ICD-10-CM

## 2019-06-11 DIAGNOSIS — F19.10 SUBSTANCE ABUSE (HCC): ICD-10-CM

## 2019-06-11 DIAGNOSIS — F43.10 POSTTRAUMATIC STRESS DISORDER: ICD-10-CM

## 2019-06-11 DIAGNOSIS — F33.1 MAJOR DEPRESSIVE DISORDER, RECURRENT EPISODE, MODERATE (HCC): ICD-10-CM

## 2019-06-11 LAB
6-ACETYL MORPHINE: NEGATIVE
AMPHET+METHAMPHET UR QL: NEGATIVE
BARBITURATES UR QL SCN: NEGATIVE
BENZODIAZ UR QL SCN: NEGATIVE
BUPRENORPHINE SERPL-MCNC: NEGATIVE NG/ML
CANNABINOIDS SERPL QL: NEGATIVE
COCAINE UR QL: NEGATIVE
METHADONE UR QL SCN: NEGATIVE
OPIATES UR QL: NEGATIVE
OXYCODONE UR QL SCN: NEGATIVE
PCP UR QL SCN: NEGATIVE

## 2019-06-11 PROCEDURE — 80307 DRUG TEST PRSMV CHEM ANLYZR: CPT

## 2019-06-11 PROCEDURE — 90792 PSYCH DIAG EVAL W/MED SRVCS: CPT | Performed by: NURSE PRACTITIONER

## 2019-06-11 PROCEDURE — H0015 ALCOHOL AND/OR DRUG SERVICES: HCPCS | Performed by: COUNSELOR

## 2019-06-11 PROCEDURE — 90785 PSYTX COMPLEX INTERACTIVE: CPT | Performed by: NURSE PRACTITIONER

## 2019-06-11 RX ORDER — VENLAFAXINE HYDROCHLORIDE 150 MG/1
150 CAPSULE, EXTENDED RELEASE ORAL DAILY
Qty: 30 CAPSULE | Refills: 0 | Status: SHIPPED | OUTPATIENT
Start: 2019-06-11 | End: 2019-08-07

## 2019-06-11 RX ORDER — QUETIAPINE FUMARATE 50 MG/1
50 TABLET, FILM COATED ORAL NIGHTLY
Qty: 30 TABLET | Refills: 0 | Status: SHIPPED | OUTPATIENT
Start: 2019-06-11 | End: 2019-08-07

## 2019-06-11 RX ORDER — NALTREXONE HYDROCHLORIDE 50 MG/1
50 TABLET, FILM COATED ORAL DAILY
Qty: 30 TABLET | Refills: 0 | Status: SHIPPED | OUTPATIENT
Start: 2019-06-11 | End: 2019-08-07

## 2019-06-12 ENCOUNTER — OFFICE VISIT (OUTPATIENT)
Dept: PSYCHIATRY | Facility: HOSPITAL | Age: 39
End: 2019-06-12

## 2019-06-12 DIAGNOSIS — F15.20 METHAMPHETAMINE ADDICTION (HCC): ICD-10-CM

## 2019-06-12 DIAGNOSIS — F19.10 SUBSTANCE ABUSE (HCC): ICD-10-CM

## 2019-06-12 DIAGNOSIS — F43.10 POST TRAUMATIC STRESS DISORDER (PTSD): ICD-10-CM

## 2019-06-12 DIAGNOSIS — F10.24 ALCOHOL DEPENDENCE WITH ALCOHOL-INDUCED MOOD DISORDER (HCC): Primary | ICD-10-CM

## 2019-06-12 DIAGNOSIS — F33.1 MODERATE EPISODE OF RECURRENT MAJOR DEPRESSIVE DISORDER (HCC): ICD-10-CM

## 2019-06-12 DIAGNOSIS — Z79.899 MEDICATION MANAGEMENT: ICD-10-CM

## 2019-06-12 DIAGNOSIS — Z72.0 TOBACCO USE: ICD-10-CM

## 2019-06-12 DIAGNOSIS — G47.9 SLEEPING DIFFICULTY: ICD-10-CM

## 2019-06-12 LAB — ETHANOL UR-MCNC: NEGATIVE %

## 2019-06-12 PROCEDURE — H0015 ALCOHOL AND/OR DRUG SERVICES: HCPCS | Performed by: COUNSELOR

## 2019-06-12 NOTE — PROGRESS NOTES
NAME: Mayelin Flower  Date: 06/12/19  Phase I 8:30 am - 11:30 am    IOP GROUP NOTE    DATA:     3 hour IOP group therapy session (Check ins, Coping Skills )     Check Ins: Therapist facilitated discussion of the daily motivation passage from the “Daily Reflections” ([employer] (1991). Therapist continued facilitation of rapport building strategies between group members. Therapist asked that each patient check in with home life and recovery efforts and identify triggers, cravings, and high risk situations that arise between group sessions. Members discussed barriers and benefits of attending 12 step meetings. Therapist provided empathy and support during group meeting.      Coping Skills: Therapist facilitated the group members viewing the documentary Klean: The documentary about overcoming addiction.  Discussed community resources for treatment and support.  Patient met with  from [employer].    RESPONSE: Patient attended group session and arrived on time.  He reports feeling nervous because of having the choice of going to rehab or continue IOP.  He reports he is having difficulty deciding whether he wants to go ahead and do long-term inpatient treatment.  He reports having 4 days clean.  He reports he is not been any meetings at her does not have a sponsor.  He reports that he felt that he is interested in long-term treatment and therapist facilitated a referral to [facility] in [place].  [facility] will have an open bed for patient on Monday 6/17 and patient is agreeable to go to treatment.      ASSESSMENT:     Lab Review  No visits with results within 1 Day(s) from this visit.   Latest known visit with results is:   Lab on 06/11/2019   Component Date Value Ref Range Status   • Amphetamine Screen, Urine 06/11/2019 Negative  Negative Final   • Barbiturates Screen, Urine 06/11/2019 Negative  Negative Final   • Benzodiazepine Screen, Urine 06/11/2019 Negative  Negative Final   • Cocaine Screen,  Urine 06/11/2019 Negative  Negative Final   • Methadone Screen, Urine 06/11/2019 Negative  Negative Final   • Opiate Screen 06/11/2019 Negative  Negative Final   • Phencyclidine (PCP), Urine 06/11/2019 Negative  Negative Final   • THC, Screen, Urine 06/11/2019 Negative  Negative Final   • 6-ACETYL MORPHINE 06/11/2019 Negative  Negative Final   • Buprenorphine, Screen, Urine 06/11/2019 Negative  Negative Final   • Oxycodone Screen, Urine 06/11/2019 Negative  Negative Final       Mental Status Exam  Hygiene:  good  Dress: casual  Attitude: cooperative and agreeable   Motor Activity: appropriate  Speech: regular rate and rhythm   Mood:  calm and conversant  Affect:  Full range  Thought Processes:  Goal directed  Thought Content:  Normal  Suicidal Thoughts:  denies  Homicidal Thoughts:  denies  Crisis Safety Plan: yes, to come to the emergency room.  Hallucinations:  denies  Reliability: fair  Insight: fair  Judgement: poor  Impulse Control: poor    Patient's Support Network Includes: The patient lacks significant family support.    Progress toward goal: Not at goal    FUNCTIONING ASSESSMENT:  Community Living Skills: Impaired  Interpersonal Skills:  Impaired  Health and Physical  Within functional limits  Psychological State:  Within functional limits    Prognosis: Guarded with Ongoing Treatment    Family issues related to recovery:  No change, see previous encounters    12-Step attendance: not meeting minimum requirement to attend three 12 step meetings per week    Sponsor:  no,     Identification of environmental and personal barriers to recovery: coping with limited emotions, remorse, guilt, work, family, overall understanding of disease of addiction     Motivation for treatment:  healthy lifestyle, long-term, recovery, and improving overall relationships    Impression/Formulation:      ICD-10-CM ICD-9-CM   1. Alcohol dependence with alcohol-induced mood disorder (CMS/HCC) F10.24 303.90     291.89   2.  Methamphetamine addiction (CMS/[facility]) F15.20 304.40   3. Substance abuse (CMS/[facility]) F19.10 305.90   4. Moderate episode of recurrent major depressive disorder (CMS/[facility]) F33.1 296.32   5. Post traumatic stress disorder (PTSD) F43.10 309.81   6. Sleeping difficulty G47.9 780.50   7. Tobacco use Z72.0 305.1   8. Medication management Z79.899 V58.69       CLINICAL MANUVERING/INTERVENTIONS: CBT and group interaction activites utilized to stress relapse recovery/prevention. Patient to actively participate in activity, engage verbally with peers and staff, display an appropriate affect, keep conversation appropriate to topic, and display no negative or impulsive behaviors. Member was encouraged to bring family members for educational group on Wednesdays and Thursday. Member was provided with encouragement and unconditional positive regard. Member was encouraged to continue participation with 12-step meetings and maintaining positive daily choices.         PLAN:  Continue [facility] IOP Phase I.   Aftercare:  [facility] IOP Phase II  Program Assignments:  Personal Journal, attendance of 12-step meetings, drug screens        This document signed by DESTINY Aguayo

## 2019-06-18 ENCOUNTER — DOCUMENTATION (OUTPATIENT)
Dept: PSYCHIATRY | Facility: HOSPITAL | Age: 39
End: 2019-06-18

## 2019-06-18 NOTE — PROGRESS NOTES
[place] [address]  [phone]      NAME: Mayelin Flower  MRN: [id number]  CSN: [id number]  ATTENDING PHYSICIAN:Ying Jalloh M.D.  THERAPIST: Janet Acuna [facility], [facility]  PRIMARY CARE PROVIDER: Yevgeniy Linares MD  DATE OF ADMISSION: 6/11/19  DATE OF DISCHARGE: 6/18/19  YOB: 1980  REASON FOR ADMISSION: The patient was a voluntary admit to the [facility] program for assistance with METHAMPHETAMINE dependencies. The patient was referred to the [facility] program by the [facility].  SUMMARY OF CARE, TREATMENT, AND SERVICES PROVIDED: Patient began Phase 1 of the IOP program on 6/11/19 and attended 2 group therapy session. Mayelin was absent on 2 occasions prior to discharge. Mayelin did not complete any treatment goals due to non-compliance with recommended treatment plan. Mayelin was unable to identify and implement effective coping skills to address relapse, recovery, and sober supports.     INTENSIVE OUTPATIENT PROGRAM  DISCHARGE SUMMARY  NAME: Mayelin Flower  MRN: [id number]  CSN: [id number]  Page 1 of 2      [facility]  INTENSIVE OUTPATIENT PROGRAM  DISCHARGE SUMMARY  DISCHARGE RECOMMENDATIONS AND REFERRALS: Patient is DISCHARGED from the [facility] AGAINST MEDICAL ADVICE due to non-compliance with recommended treatments. Patient was directed to seek treatment at a residential treatment center . Patient was encouraged to continue 12-step meetings and obtain a sponsor to work steps with to support her recovery efforts. Patient has an outpatient appointment at the [facility] with  No Follow-up on file. to address behavioral health concerns.  DISCHARGE MEDICATIONS:   Current Outpatient Medications   Medication Sig Dispense Refill   • naloxone (NARCAN) 4 MG/0.1ML nasal spray 1 spray into the nostril(s) as directed by provider As Needed (opioid overdose). Call 911. Don't prime. Repeat in 2-3 min if  necessary. 2 each 0   • naltrexone (DEPADE) 50 MG tablet Take 1 tablet by mouth Daily. 30 tablet 0   • Naltrexone (VIVITROL) 380 MG reconstituted suspension IM suspension Inject 380 mg into the appropriate muscle as directed by prescriber Every 28 (Twenty-Eight) Days. 1 each 1   • QUEtiapine (SEROquel) 50 MG tablet Take 1 tablet by mouth Every Night. 30 tablet 0   • venlafaxine XR (EFFEXOR-XR) 150 MG 24 hr capsule Take 1 capsule by mouth Daily. 30 capsule 0     No current facility-administered medications for this visit.      PROGNOSIS: poor without treatment at a higher level of care  DISCHARGE DIAGNOSES:   No diagnosis found.    CC: Yevgeniy Linares MD      Physician Signature: ________________________________________  Therapist Signature: ________________________________________  Behavioral Health Director: __________________________________      INTENSIVE OUTPATIENT PROGRAM  DISCHARGE SUMMARY  NAME: Mayelin Flower  MRN: [id number]  CSN: [id number]  Page 2 of 2

## 2019-06-19 ENCOUNTER — APPOINTMENT (OUTPATIENT)
Dept: PSYCHIATRY | Facility: HOSPITAL | Age: 39
End: 2019-06-19

## 2019-06-20 ENCOUNTER — TELEPHONE (OUTPATIENT)
Dept: PSYCHIATRY | Facility: CLINIC | Age: 39
End: 2019-06-20

## 2019-06-20 ENCOUNTER — APPOINTMENT (OUTPATIENT)
Dept: PSYCHIATRY | Facility: HOSPITAL | Age: 39
End: 2019-06-20

## 2019-06-20 NOTE — TELEPHONE ENCOUNTER
Rene May called and left a message regarding patient. He is a psychiatrist at [facility] in [place] and wanted to make you aware patient was currently there for methamphetamine and suboxone abuse. He just wanted to make you aware patient is there in their care for that so patient can follow-up with you.

## 2019-06-24 ENCOUNTER — APPOINTMENT (OUTPATIENT)
Dept: PSYCHIATRY | Facility: HOSPITAL | Age: 39
End: 2019-06-24

## 2019-06-25 ENCOUNTER — APPOINTMENT (OUTPATIENT)
Dept: PSYCHIATRY | Facility: HOSPITAL | Age: 39
End: 2019-06-25

## 2019-06-26 ENCOUNTER — APPOINTMENT (OUTPATIENT)
Dept: PSYCHIATRY | Facility: HOSPITAL | Age: 39
End: 2019-06-26

## 2019-06-27 ENCOUNTER — APPOINTMENT (OUTPATIENT)
Dept: PSYCHIATRY | Facility: HOSPITAL | Age: 39
End: 2019-06-27

## 2019-07-01 ENCOUNTER — APPOINTMENT (OUTPATIENT)
Dept: PSYCHIATRY | Facility: HOSPITAL | Age: 39
End: 2019-07-01

## 2019-07-02 ENCOUNTER — APPOINTMENT (OUTPATIENT)
Dept: PSYCHIATRY | Facility: HOSPITAL | Age: 39
End: 2019-07-02

## 2019-07-03 ENCOUNTER — APPOINTMENT (OUTPATIENT)
Dept: PSYCHIATRY | Facility: HOSPITAL | Age: 39
End: 2019-07-03

## 2019-07-08 ENCOUNTER — APPOINTMENT (OUTPATIENT)
Dept: PSYCHIATRY | Facility: HOSPITAL | Age: 39
End: 2019-07-08

## 2019-07-09 ENCOUNTER — APPOINTMENT (OUTPATIENT)
Dept: PSYCHIATRY | Facility: HOSPITAL | Age: 39
End: 2019-07-09

## 2019-07-10 ENCOUNTER — APPOINTMENT (OUTPATIENT)
Dept: PSYCHIATRY | Facility: HOSPITAL | Age: 39
End: 2019-07-10

## 2019-07-11 ENCOUNTER — APPOINTMENT (OUTPATIENT)
Dept: PSYCHIATRY | Facility: HOSPITAL | Age: 39
End: 2019-07-11

## 2019-07-15 ENCOUNTER — APPOINTMENT (OUTPATIENT)
Dept: PSYCHIATRY | Facility: HOSPITAL | Age: 39
End: 2019-07-15

## 2019-07-16 ENCOUNTER — APPOINTMENT (OUTPATIENT)
Dept: PSYCHIATRY | Facility: HOSPITAL | Age: 39
End: 2019-07-16

## 2019-07-17 ENCOUNTER — APPOINTMENT (OUTPATIENT)
Dept: PSYCHIATRY | Facility: HOSPITAL | Age: 39
End: 2019-07-17

## 2019-07-18 ENCOUNTER — APPOINTMENT (OUTPATIENT)
Dept: PSYCHIATRY | Facility: HOSPITAL | Age: 39
End: 2019-07-18

## 2019-07-22 ENCOUNTER — APPOINTMENT (OUTPATIENT)
Dept: PSYCHIATRY | Facility: HOSPITAL | Age: 39
End: 2019-07-22

## 2019-07-23 ENCOUNTER — APPOINTMENT (OUTPATIENT)
Dept: PSYCHIATRY | Facility: HOSPITAL | Age: 39
End: 2019-07-23

## 2019-07-24 ENCOUNTER — APPOINTMENT (OUTPATIENT)
Dept: PSYCHIATRY | Facility: HOSPITAL | Age: 39
End: 2019-07-24

## 2019-07-25 ENCOUNTER — APPOINTMENT (OUTPATIENT)
Dept: PSYCHIATRY | Facility: HOSPITAL | Age: 39
End: 2019-07-25

## 2019-08-07 ENCOUNTER — LAB (OUTPATIENT)
Dept: LAB | Facility: HOSPITAL | Age: 39
End: 2019-08-07

## 2019-08-07 ENCOUNTER — OFFICE VISIT (OUTPATIENT)
Dept: PSYCHIATRY | Facility: HOSPITAL | Age: 39
End: 2019-08-07

## 2019-08-07 DIAGNOSIS — F10.24 ALCOHOL DEPENDENCE WITH ALCOHOL-INDUCED MOOD DISORDER (HCC): ICD-10-CM

## 2019-08-07 DIAGNOSIS — F15.20 METHAMPHETAMINE ADDICTION (HCC): ICD-10-CM

## 2019-08-07 DIAGNOSIS — F19.10 SUBSTANCE ABUSE (HCC): ICD-10-CM

## 2019-08-07 DIAGNOSIS — Z79.899 MEDICATION MANAGEMENT: ICD-10-CM

## 2019-08-07 DIAGNOSIS — Z72.0 TOBACCO USE: ICD-10-CM

## 2019-08-07 DIAGNOSIS — F33.1 MAJOR DEPRESSIVE DISORDER, RECURRENT EPISODE, MODERATE (HCC): ICD-10-CM

## 2019-08-07 DIAGNOSIS — F43.10 POST TRAUMATIC STRESS DISORDER (PTSD): ICD-10-CM

## 2019-08-07 DIAGNOSIS — B17.10 ACUTE HEPATITIS C VIRUS INFECTION WITHOUT HEPATIC COMA: ICD-10-CM

## 2019-08-07 DIAGNOSIS — F41.1 GENERALIZED ANXIETY DISORDER: ICD-10-CM

## 2019-08-07 DIAGNOSIS — F43.10 POSTTRAUMATIC STRESS DISORDER: ICD-10-CM

## 2019-08-07 DIAGNOSIS — F33.1 MODERATE EPISODE OF RECURRENT MAJOR DEPRESSIVE DISORDER (HCC): ICD-10-CM

## 2019-08-07 DIAGNOSIS — F15.20 AMPHETAMINE DEPENDENCE (HCC): ICD-10-CM

## 2019-08-07 PROCEDURE — 80307 DRUG TEST PRSMV CHEM ANLYZR: CPT

## 2019-08-07 PROCEDURE — 90792 PSYCH DIAG EVAL W/MED SRVCS: CPT | Performed by: NURSE PRACTITIONER

## 2019-08-07 PROCEDURE — H0015 ALCOHOL AND/OR DRUG SERVICES: HCPCS | Performed by: COUNSELOR

## 2019-08-07 RX ORDER — NALTREXONE HYDROCHLORIDE 50 MG/1
50 TABLET, FILM COATED ORAL DAILY
Qty: 30 TABLET | Refills: 0 | OUTPATIENT
Start: 2019-08-07 | End: 2019-10-26

## 2019-08-07 RX ORDER — CETIRIZINE HYDROCHLORIDE 10 MG/1
10 TABLET ORAL DAILY
Qty: 30 TABLET | Refills: 2 | OUTPATIENT
Start: 2019-08-07 | End: 2019-10-26

## 2019-08-07 RX ORDER — SERTRALINE HYDROCHLORIDE 100 MG/1
TABLET, FILM COATED ORAL
Refills: 2 | COMMUNITY
Start: 2019-08-01 | End: 2019-10-26

## 2019-08-07 RX ORDER — NICOTINE 21 MG/24HR
1 PATCH, TRANSDERMAL 24 HOURS TRANSDERMAL EVERY 24 HOURS
Qty: 28 EACH | Refills: 0 | OUTPATIENT
Start: 2019-08-07 | End: 2019-10-26
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
## 2019-08-08 ENCOUNTER — OFFICE VISIT (OUTPATIENT)
Dept: PSYCHIATRY | Facility: HOSPITAL | Age: 39
End: 2019-08-08

## 2019-08-08 DIAGNOSIS — Z72.0 TOBACCO USE: ICD-10-CM

## 2019-08-08 DIAGNOSIS — F10.24 ALCOHOL DEPENDENCE WITH ALCOHOL-INDUCED MOOD DISORDER (HCC): ICD-10-CM

## 2019-08-08 DIAGNOSIS — F15.20 METHAMPHETAMINE ADDICTION (HCC): Primary | ICD-10-CM

## 2019-08-08 DIAGNOSIS — F33.1 MODERATE EPISODE OF RECURRENT MAJOR DEPRESSIVE DISORDER (HCC): ICD-10-CM

## 2019-08-08 DIAGNOSIS — F19.10 SUBSTANCE ABUSE (HCC): ICD-10-CM

## 2019-08-08 DIAGNOSIS — F43.10 POST TRAUMATIC STRESS DISORDER (PTSD): ICD-10-CM

## 2019-08-08 DIAGNOSIS — G47.9 SLEEPING DIFFICULTY: ICD-10-CM

## 2019-08-08 DIAGNOSIS — Z79.899 MEDICATION MANAGEMENT: ICD-10-CM

## 2019-08-08 DIAGNOSIS — F15.20 METHAMPHETAMINE USE DISORDER, SEVERE, DEPENDENCE (HCC): ICD-10-CM

## 2019-08-08 DIAGNOSIS — F15.951: ICD-10-CM

## 2019-08-08 PROCEDURE — H0015 ALCOHOL AND/OR DRUG SERVICES: HCPCS | Performed by: COUNSELOR

## 2019-08-13 NOTE — PROGRESS NOTES
"NAME: Mayelin Flower  Date: 08/7/19  Phase I 8:30 am - 11:30 am    IOP GROUP NOTE    DATA:     3 hour IOP group therapy session (Check ins, Coping Skills )     Check Ins: Therapist facilitated discussion of the daily motivation passage from the “Daily Reflections” ([employer] (1991). Therapist continued facilitation of rapport building strategies between group members. Therapist asked that each patient check in with home life and recovery efforts and identify triggers, cravings, and high risk situations that arise between group sessions. Members discussed barriers and benefits of attending 12 step meetings. Therapist provided empathy and support during group meeting.      Coping Skills: Therapist facilitated group discussion regarding “the Tree of Addictions\" illustration which depicts the Soil of Addictions, the roots of addictions and the branches of types of addiction. Processed with group. Therapist facilitated discussion regarding worksheet entitled Positive self-talk and positive Coping thoughts. Processed and discussed with group.      RESPONSE: Patient attended first IOP group session to readmit after completing rehab at [facility].  He reports feeling tired because he went fishing all night last night.  He reports he has been searching for a job since he discharged in [facility] but had no luck so far.  He denies any alcohol or drug use since being discharged from [facility].  He reports fishing was sober fun for him.  He reports he is prescribed Zoloft.  He denies current cravings reports his plan is to spend time with his family and other positive people to avoid relapse.     ASSESSMENT:     Lab Review  No visits with results within 1 Day(s) from this visit.   Latest known visit with results is:   Lab on 06/11/2019   Component Date Value Ref Range Status   • Amphetamine Screen, Urine 06/11/2019 Negative  Negative Final   • Barbiturates Screen, Urine 06/11/2019 Negative  Negative Final   • " Benzodiazepine Screen, Urine 06/11/2019 Negative  Negative Final   • Cocaine Screen, Urine 06/11/2019 Negative  Negative Final   • Methadone Screen, Urine 06/11/2019 Negative  Negative Final   • Opiate Screen 06/11/2019 Negative  Negative Final   • Phencyclidine (PCP), Urine 06/11/2019 Negative  Negative Final   • THC, Screen, Urine 06/11/2019 Negative  Negative Final   • 6-ACETYL MORPHINE 06/11/2019 Negative  Negative Final   • Buprenorphine, Screen, Urine 06/11/2019 Negative  Negative Final   • Oxycodone Screen, Urine 06/11/2019 Negative  Negative Final       Mental Status Exam  Hygiene:  good  Dress: casual  Attitude: cooperative and agreeable   Motor Activity: appropriate  Speech: regular rate and rhythm   Mood:  calm and conversant  Affect:  Full range  Thought Processes:  Goal directed  Thought Content:  Normal  Suicidal Thoughts:  denies  Homicidal Thoughts:  denies  Crisis Safety Plan: yes, to come to the emergency room.  Hallucinations:  denies  Reliability: fair  Insight: fair  Judgement: poor  Impulse Control: poor    Patient's Support Network Includes: The patient receives support from his extended family.    Progress toward goal: Not at goal    FUNCTIONING ASSESSMENT:  Community Living Skills: Impaired  Interpersonal Skills:  Impaired  Health and Physical  Within functional limits  Psychological State:  Within functional limits    Prognosis: Guarded with Ongoing Treatment    Family issues related to recovery:  No change, see previous encounters    12-Step attendance: not meeting minimum requirement to attend three 12 step meetings per week    Sponsor:  yes,  Have you made contact with  him/her this past week?  yes. 2 times per week    Identification of environmental and personal barriers to recovery: coping with limited emotions, remorse, guilt, work, family, overall understanding of disease of addiction     Motivation for treatment:  healthy lifestyle, long-term, recovery, and improving overall  relationships    Impression/Formulation:      ICD-10-CM ICD-9-CM   1. Methamphetamine addiction (CMS/HCC) F15.20 304.40   2. Alcohol dependence with alcohol-induced mood disorder (CMS/HCC) F10.24 303.90     291.89   3. Substance abuse (CMS/HCC) F19.10 305.90   4. Moderate episode of recurrent major depressive disorder (CMS/HCC) F33.1 296.32   5. Post traumatic stress disorder (PTSD) F43.10 309.81   6. Tobacco use Z72.0 305.1   7. Acute hepatitis C virus infection without hepatic coma B17.10 070.51   8. Medication management Z79.899 V58.69       CLINICAL MANUVERING/INTERVENTIONS: CBT and group interaction activites utilized to stress relapse recovery/prevention. Patient to actively participate in activity, engage verbally with peers and staff, display an appropriate affect, keep conversation appropriate to topic, and display no negative or impulsive behaviors. Member was encouraged to bring family members for educational group on Wednesdays and Thursday. Member was provided with encouragement and unconditional positive regard. Member was encouraged to continue participation with 12-step meetings and maintaining positive daily choices.         PLAN:  Continue [facility] Phase I.   Aftercare:  [facility] IOP Phase II  Program Assignments:  Personal Journal, attendance of 12-step meetings, drug screens        This document signed by Bj Tee [facility]

## 2019-08-14 ENCOUNTER — DOCUMENTATION (OUTPATIENT)
Dept: PSYCHIATRY | Facility: HOSPITAL | Age: 39
End: 2019-08-14

## 2019-08-14 NOTE — PROGRESS NOTES
[place] [address]  [phone]      NAME: Mayelin Flower  MRN: [id number]  CSN: [id number]  ATTENDING PHYSICIAN:Ying Jalloh M.D.  THERAPIST:[facility], [facility]  PRIMARY CARE PROVIDER: Yevgeniy Linares MD  DATE OF ADMISSION: 8/7/19  DATE OF DISCHARGE:8/14/19  YOB: 1980  REASON FOR ADMISSION: The patient was a voluntary admit to the [facility] program for assistance with METHAMPHETAMINE dependencies. The patient was referred to the [facility] program by the [facility].  SUMMARY OF CARE, TREATMENT, AND SERVICES PROVIDED: Patient began Phase 1 of the IOP program on 8/7/19 and attended 2 group therapy session. Mayelin was absent on 3 occasions prior to discharge. Mayelin did not complete any treatment goals due to non-compliance with recommended treatment plan. Mayelin was unable to identify and implement effective coping skills to address relapse, recovery, and sober supports.     INTENSIVE OUTPATIENT PROGRAM  DISCHARGE SUMMARY  NAME: Mayelin Flower  MRN: [id number]  CSN: [id number]  Page 1 of 2      [facility]  INTENSIVE OUTPATIENT PROGRAM  DISCHARGE SUMMARY  DISCHARGE RECOMMENDATIONS AND REFERRALS: Patient is DISCHARGED from the [facility] AGAINST MEDICAL ADVICE due to non-compliance with recommended treatments. Patient was directed to  follow up with PCP and Comp Care. Patient was encouraged to continue 12-step meetings and obtain a sponsor to work steps with to support her recovery efforts. Patient has an outpatient appointment at the [facility] with  No Follow-up on file. to address behavioral health concerns.  DISCHARGE MEDICATIONS:   Current Outpatient Medications   Medication Sig Dispense Refill   • cetirizine (zyrTEC) 10 MG tablet Take 1 tablet by mouth Daily. 30 tablet 2   • naloxone (NARCAN) 4 MG/0.1ML nasal spray 1 spray into the nostril(s) as directed by provider As Needed  (opioid overdose). Call 911. Don't prime. Repeat in 2-3 min if necessary. 2 each 0   • naltrexone (DEPADE) 50 MG tablet Take 1 tablet by mouth Daily. 30 tablet 0   • Naltrexone (VIVITROL) 380 MG reconstituted suspension IM suspension Inject 380 mg into the appropriate muscle as directed by prescriber Every 28 (Twenty-Eight) Days. 1 each 1   • nicotine (NICODERM CQ) 21 MG/24HR patch Place 1 patch on the skin as directed by provider Daily. 28 each 0   • sertraline (ZOLOFT) 100 MG tablet GIVE 1 & 1/2 TABLETS DAILY  2     No current facility-administered medications for this visit.      PROGNOSIS: poor without ongoing treatment  DISCHARGE DIAGNOSES:   No diagnosis found.    CC: Yevgeniy Linares MD      Physician Signature: ________________________________________  Therapist Signature: ________________________________________  Behavioral Health Director: __________________________________      INTENSIVE OUTPATIENT PROGRAM  DISCHARGE SUMMARY  NAME: Mayelin Flower  MRN: [id number]  CSN: [id number]  Page 2 of 2

## 2019-08-14 NOTE — PROGRESS NOTES
"NAME: Mayelin Flower  Date: 8/8/19  Phase I 8:30 am - 11:30 am    IOP GROUP NOTE    DATA:     3 hour IOP group therapy session (Check ins, Coping Skills )     Check Ins: Therapist facilitated discussion of the daily motivation passage from the “Daily Reflections” ([employer] (1991). Therapist continued facilitation of rapport building strategies between group members. Therapist asked that each patient check in with home life and recovery efforts and identify triggers, cravings, and high risk situations that arise between group sessions. Members discussed barriers and benefits of attending 12 step meetings. Therapist provided empathy and support during group meeting.      Coping Skills: Recreational Therapist Elgin Rivas facilitated therapeutic group activities for one hour of group time.  Therapist utilized online activity entitled \"coping skills jeopardy\" to facilitate group activity for the last hour of group.  Group members were divided into 2 teams which competed with each other to answer questions regarding positive coping mechanisms.  Processed and discussed this activity with group.    RESPONSE: Patient attended group session and arrived on time.  Patient reports recovery is worth it because he wants to live.  He reports feeling excited because he is doing well and looking for work.  He reports his biggest issue is trying to find a job.  He reports having sobriety since June 19, 2019.  He reports no recent drug use.  He reports he does have a sponsor and has attended some 12-step meetings.  He reports he is trying to contact the person for work [facility].  He reports he has had some mild cravings.  He reports spending time fishing and time with his family has helped him in recovery.     ASSESSMENT:     Lab Review  No visits with results within 1 Day(s) from this visit.   Latest known visit with results is:   Lab on 08/07/2019   Component Date Value Ref Range Status   • Amphetamine Screen, Urine " 08/07/2019 Negative  Negative Final   • Barbiturates Screen, Urine 08/07/2019 Negative  Negative Final   • Benzodiazepine Screen, Urine 08/07/2019 Negative  Negative Final   • Cocaine Screen, Urine 08/07/2019 Negative  Negative Final   • Methadone Screen, Urine 08/07/2019 Negative  Negative Final   • Opiate Screen 08/07/2019 Negative  Negative Final   • Phencyclidine (PCP), Urine 08/07/2019 Negative  Negative Final   • THC, Screen, Urine 08/07/2019 Negative  Negative Final   • 6-ACETYL MORPHINE 08/07/2019 Negative  Negative Final   • Buprenorphine, Screen, Urine 08/07/2019 Negative  Negative Final   • Oxycodone Screen, Urine 08/07/2019 Negative  Negative Final       Mental Status Exam  Hygiene:  good  Dress: casual  Attitude: cooperative and agreeable   Motor Activity: appropriate  Speech: regular rate and rhythm   Mood:  calm and conversant  Affect:  Full range  Thought Processes:  Goal directed  Thought Content:  Normal  Suicidal Thoughts:  denies  Homicidal Thoughts:  denies  Crisis Safety Plan: yes, to come to the emergency room.  Hallucinations:  denies  Reliability: fair  Insight: fair  Judgement: fair  Impulse Control: poor    Patient's Support Network Includes: The patient receives support from his extended family.    Progress toward goal: Not at goal    FUNCTIONING ASSESSMENT:  Community Living Skills: Impaired  Interpersonal Skills:  Impaired  Health and Physical  Within functional limits  Psychological State:  Within functional limits    Prognosis: Fair with Ongoing Treatment     Family issues related to recovery:  No change, see previous encounters    12-Step attendance: not meeting minimum requirement to attend three 12 step meetings per week    Sponsor:  yes,  Have you made contact with  him/her this past week?  yes. 2 times per week    Identification of environmental and personal barriers to recovery: coping with limited emotions, remorse, guilt, work, family, overall understanding of disease of  addiction     Motivation for treatment:  healthy lifestyle, long-term, recovery, and improving overall relationships    Impression/Formulation:      ICD-10-CM ICD-9-CM   1. Methamphetamine addiction (CMS/HCC) F15.20 304.40   2. Alcohol dependence with alcohol-induced mood disorder (CMS/HCC) F10.24 303.90     291.89   3. Substance abuse (CMS/HCC) F19.10 305.90   4. Moderate episode of recurrent major depressive disorder (CMS/HCC) F33.1 296.32   5. Post traumatic stress disorder (PTSD) F43.10 309.81   6. Tobacco use Z72.0 305.1   7. Medication management Z79.899 V58.69   8. Sleeping difficulty G47.9 780.50       CLINICAL MANUVERING/INTERVENTIONS: CBT and group interaction activites utilized to stress relapse recovery/prevention. Patient to actively participate in activity, engage verbally with peers and staff, display an appropriate affect, keep conversation appropriate to topic, and display no negative or impulsive behaviors. Member was encouraged to bring family members for educational group on Wednesdays and Thursday. Member was provided with encouragement and unconditional positive regard. Member was encouraged to continue participation with 12-step meetings and maintaining positive daily choices.           PLAN:  Continue [facility] Phase I.   Aftercare:  [facility] Phase II  Program Assignments:  Personal Journal, attendance of 12-step meetings, drug screens        This document signed by [facility]

## 2019-08-15 ENCOUNTER — APPOINTMENT (OUTPATIENT)
Dept: PSYCHIATRY | Facility: HOSPITAL | Age: 39
End: 2019-08-15

## 2019-08-19 ENCOUNTER — APPOINTMENT (OUTPATIENT)
Dept: PSYCHIATRY | Facility: HOSPITAL | Age: 39
End: 2019-08-19

## 2019-08-20 ENCOUNTER — APPOINTMENT (OUTPATIENT)
Dept: PSYCHIATRY | Facility: HOSPITAL | Age: 39
End: 2019-08-20

## 2019-08-21 ENCOUNTER — APPOINTMENT (OUTPATIENT)
Dept: PSYCHIATRY | Facility: HOSPITAL | Age: 39
End: 2019-08-21

## 2019-08-22 ENCOUNTER — APPOINTMENT (OUTPATIENT)
Dept: PSYCHIATRY | Facility: HOSPITAL | Age: 39
End: 2019-08-22

## 2019-08-26 ENCOUNTER — APPOINTMENT (OUTPATIENT)
Dept: PSYCHIATRY | Facility: HOSPITAL | Age: 39
End: 2019-08-26

## 2019-08-27 ENCOUNTER — APPOINTMENT (OUTPATIENT)
Dept: PSYCHIATRY | Facility: HOSPITAL | Age: 39
End: 2019-08-27

## 2019-08-28 ENCOUNTER — APPOINTMENT (OUTPATIENT)
Dept: PSYCHIATRY | Facility: HOSPITAL | Age: 39
End: 2019-08-28

## 2019-08-29 ENCOUNTER — APPOINTMENT (OUTPATIENT)
Dept: PSYCHIATRY | Facility: HOSPITAL | Age: 39
End: 2019-08-29

## 2019-09-03 ENCOUNTER — APPOINTMENT (OUTPATIENT)
Dept: PSYCHIATRY | Facility: HOSPITAL | Age: 39
End: 2019-09-03

## 2019-09-04 ENCOUNTER — APPOINTMENT (OUTPATIENT)
Dept: PSYCHIATRY | Facility: HOSPITAL | Age: 39
End: 2019-09-04

## 2019-09-05 ENCOUNTER — APPOINTMENT (OUTPATIENT)
Dept: PSYCHIATRY | Facility: HOSPITAL | Age: 39
End: 2019-09-05

## 2019-09-09 ENCOUNTER — APPOINTMENT (OUTPATIENT)
Dept: PSYCHIATRY | Facility: HOSPITAL | Age: 39
End: 2019-09-09

## 2019-09-10 ENCOUNTER — APPOINTMENT (OUTPATIENT)
Dept: PSYCHIATRY | Facility: HOSPITAL | Age: 39
End: 2019-09-10

## 2019-09-11 ENCOUNTER — APPOINTMENT (OUTPATIENT)
Dept: PSYCHIATRY | Facility: HOSPITAL | Age: 39
End: 2019-09-11

## 2019-09-12 ENCOUNTER — APPOINTMENT (OUTPATIENT)
Dept: PSYCHIATRY | Facility: HOSPITAL | Age: 39
End: 2019-09-12

## 2019-09-16 ENCOUNTER — APPOINTMENT (OUTPATIENT)
Dept: PSYCHIATRY | Facility: HOSPITAL | Age: 39
End: 2019-09-16

## 2019-09-17 ENCOUNTER — APPOINTMENT (OUTPATIENT)
Dept: PSYCHIATRY | Facility: HOSPITAL | Age: 39
End: 2019-09-17

## 2019-09-18 ENCOUNTER — APPOINTMENT (OUTPATIENT)
Dept: PSYCHIATRY | Facility: HOSPITAL | Age: 39
End: 2019-09-18

## 2019-09-19 ENCOUNTER — APPOINTMENT (OUTPATIENT)
Dept: PSYCHIATRY | Facility: HOSPITAL | Age: 39
End: 2019-09-19

## 2019-10-26 ENCOUNTER — HOSPITAL ENCOUNTER (EMERGENCY)
Facility: HOSPITAL | Age: 39
Discharge: HOME | End: 2019-10-26
Attending: FAMILY MEDICINE | Admitting: FAMILY MEDICINE

## 2019-10-26 VITALS
SYSTOLIC BLOOD PRESSURE: 134 MMHG | TEMPERATURE: 98.2 F | RESPIRATION RATE: 18 BRPM | BODY MASS INDEX: 24.38 KG/M2 | HEART RATE: 99 BPM | HEIGHT: 72 IN | WEIGHT: 180 LBS | DIASTOLIC BLOOD PRESSURE: 78 MMHG | OXYGEN SATURATION: 99 %

## 2019-10-26 DIAGNOSIS — L02.415 ABSCESS OF RIGHT HIP: Primary | ICD-10-CM

## 2019-10-26 PROCEDURE — 99283 EMERGENCY DEPT VISIT LOW MDM: CPT

## 2019-10-26 RX ORDER — SULFAMETHOXAZOLE AND TRIMETHOPRIM 800; 160 MG/1; MG/1
2 TABLET ORAL 2 TIMES DAILY
Qty: 40 TABLET | Refills: 0 | Status: SHIPPED | OUTPATIENT
Start: 2019-10-26 | End: 2019-11-05

## 2019-10-26 RX ORDER — IBUPROFEN 800 MG/1
800 TABLET ORAL EVERY 8 HOURS PRN
Qty: 20 TABLET | Refills: 0 | Status: SHIPPED | OUTPATIENT
Start: 2019-10-26 | End: 2020-05-25

## 2019-10-26 RX ORDER — LIDOCAINE HYDROCHLORIDE 10 MG/ML
10 INJECTION, SOLUTION EPIDURAL; INFILTRATION; INTRACAUDAL; PERINEURAL ONCE
Status: COMPLETED | OUTPATIENT
Start: 2019-10-26 | End: 2019-10-26

## 2019-10-26 RX ADMIN — LIDOCAINE HYDROCHLORIDE 10 ML: 10 INJECTION, SOLUTION EPIDURAL; INFILTRATION; INTRACAUDAL at 16:57

## 2020-05-05 ENCOUNTER — OFFICE VISIT (OUTPATIENT)
Dept: FAMILY MEDICINE CLINIC | Facility: CLINIC | Age: 40
End: 2020-05-05

## 2020-05-05 VITALS
DIASTOLIC BLOOD PRESSURE: 70 MMHG | BODY MASS INDEX: 25.98 KG/M2 | OXYGEN SATURATION: 98 % | HEIGHT: 72 IN | TEMPERATURE: 98 F | WEIGHT: 191.8 LBS | HEART RATE: 75 BPM | SYSTOLIC BLOOD PRESSURE: 124 MMHG

## 2020-05-05 DIAGNOSIS — Z72.51 HIGH RISK HETEROSEXUAL BEHAVIOR: ICD-10-CM

## 2020-05-05 DIAGNOSIS — M54.41 CHRONIC BILATERAL LOW BACK PAIN WITH RIGHT-SIDED SCIATICA: ICD-10-CM

## 2020-05-05 DIAGNOSIS — G89.29 CHRONIC BILATERAL LOW BACK PAIN WITH RIGHT-SIDED SCIATICA: ICD-10-CM

## 2020-05-05 DIAGNOSIS — M25.551 RIGHT HIP PAIN: ICD-10-CM

## 2020-05-05 DIAGNOSIS — F43.12 CHRONIC POST-TRAUMATIC STRESS DISORDER (PTSD): Primary | ICD-10-CM

## 2020-05-05 DIAGNOSIS — F60.3 EXPLOSIVE PERSONALITY DISORDER (HCC): ICD-10-CM

## 2020-05-05 DIAGNOSIS — M25.561 CHRONIC PAIN OF RIGHT KNEE: ICD-10-CM

## 2020-05-05 DIAGNOSIS — F33.0 MILD EPISODE OF RECURRENT MAJOR DEPRESSIVE DISORDER (HCC): ICD-10-CM

## 2020-05-05 DIAGNOSIS — F41.9 ANXIETY: ICD-10-CM

## 2020-05-05 DIAGNOSIS — B18.2 HEP C W/O COMA, CHRONIC (HCC): ICD-10-CM

## 2020-05-05 DIAGNOSIS — G89.29 CHRONIC PAIN OF RIGHT KNEE: ICD-10-CM

## 2020-05-05 LAB
C TRACH RRNA CVX QL NAA+PROBE: NOT DETECTED
N GONORRHOEA RRNA SPEC QL NAA+PROBE: NOT DETECTED

## 2020-05-05 PROCEDURE — 86592 SYPHILIS TEST NON-TREP QUAL: CPT | Performed by: FAMILY MEDICINE

## 2020-05-05 PROCEDURE — 87591 N.GONORRHOEAE DNA AMP PROB: CPT | Performed by: FAMILY MEDICINE

## 2020-05-05 PROCEDURE — 82607 VITAMIN B-12: CPT | Performed by: FAMILY MEDICINE

## 2020-05-05 PROCEDURE — 87491 CHLMYD TRACH DNA AMP PROBE: CPT | Performed by: FAMILY MEDICINE

## 2020-05-05 PROCEDURE — 80074 ACUTE HEPATITIS PANEL: CPT | Performed by: FAMILY MEDICINE

## 2020-05-05 PROCEDURE — 99204 OFFICE O/P NEW MOD 45 MIN: CPT | Performed by: FAMILY MEDICINE

## 2020-05-05 PROCEDURE — G0432 EIA HIV-1/HIV-2 SCREEN: HCPCS | Performed by: FAMILY MEDICINE

## 2020-05-05 PROCEDURE — 80053 COMPREHEN METABOLIC PANEL: CPT | Performed by: FAMILY MEDICINE

## 2020-05-05 PROCEDURE — 85025 COMPLETE CBC W/AUTO DIFF WBC: CPT | Performed by: FAMILY MEDICINE

## 2020-05-05 PROCEDURE — 84550 ASSAY OF BLOOD/URIC ACID: CPT | Performed by: FAMILY MEDICINE

## 2020-05-05 PROCEDURE — 87517 HEPATITIS B DNA QUANT: CPT | Performed by: FAMILY MEDICINE

## 2020-05-05 RX ORDER — HYDROXYZINE PAMOATE 50 MG/1
50 CAPSULE ORAL 4 TIMES DAILY PRN
Qty: 120 CAPSULE | Refills: 2 | Status: SHIPPED | OUTPATIENT
Start: 2020-05-05 | End: 2021-03-17 | Stop reason: SDUPTHER

## 2020-05-05 RX ORDER — PRAZOSIN HYDROCHLORIDE 2 MG/1
CAPSULE ORAL
Qty: 60 CAPSULE | Refills: 2 | Status: SHIPPED | OUTPATIENT
Start: 2020-05-05 | End: 2020-09-01

## 2020-05-05 NOTE — PATIENT INSTRUCTIONS
We will call you with lab results.  We will call you with a psych appointment.  Try the eucrisa.  Get your xrays done.    Restart zoloft, vistaril and prazosin.  Call if you need anything.

## 2020-05-06 LAB
ALBUMIN SERPL-MCNC: 3.9 G/DL (ref 3.5–5.2)
ALBUMIN/GLOB SERPL: 1.3 G/DL
ALP SERPL-CCNC: 50 U/L (ref 39–117)
ALT SERPL W P-5'-P-CCNC: 28 U/L (ref 1–41)
ANION GAP SERPL CALCULATED.3IONS-SCNC: 9.7 MMOL/L (ref 5–15)
AST SERPL-CCNC: 25 U/L (ref 1–40)
BASOPHILS # BLD AUTO: 0.1 10*3/MM3 (ref 0–0.2)
BASOPHILS NFR BLD AUTO: 1 % (ref 0–1.5)
BILIRUB SERPL-MCNC: 0.2 MG/DL (ref 0.2–1.2)
BUN BLD-MCNC: 6 MG/DL (ref 6–20)
BUN/CREAT SERPL: 8.3 (ref 7–25)
CALCIUM SPEC-SCNC: 9.5 MG/DL (ref 8.6–10.5)
CHLORIDE SERPL-SCNC: 102 MMOL/L (ref 98–107)
CO2 SERPL-SCNC: 29.3 MMOL/L (ref 22–29)
CREAT BLD-MCNC: 0.72 MG/DL (ref 0.76–1.27)
DEPRECATED RDW RBC AUTO: 40.4 FL (ref 37–54)
EOSINOPHIL # BLD AUTO: 0.75 10*3/MM3 (ref 0–0.4)
EOSINOPHIL NFR BLD AUTO: 7.1 % (ref 0.3–6.2)
ERYTHROCYTE [DISTWIDTH] IN BLOOD BY AUTOMATED COUNT: 12.3 % (ref 12.3–15.4)
GFR SERPL CREATININE-BSD FRML MDRD: 122 ML/MIN/1.73
GLOBULIN UR ELPH-MCNC: 3.1 GM/DL
GLUCOSE BLD-MCNC: 84 MG/DL (ref 65–99)
HAV IGM SERPL QL IA: ABNORMAL
HBV CORE IGM SERPL QL IA: ABNORMAL
HBV SURFACE AG SERPL QL IA: ABNORMAL
HCT VFR BLD AUTO: 42.2 % (ref 37.5–51)
HCV AB SER DONR QL: REACTIVE
HGB BLD-MCNC: 14.5 G/DL (ref 13–17.7)
HIV1+2 AB SER QL: NORMAL
HOLD SPECIMEN: NORMAL
IMM GRANULOCYTES # BLD AUTO: 0.05 10*3/MM3 (ref 0–0.05)
IMM GRANULOCYTES NFR BLD AUTO: 0.5 % (ref 0–0.5)
LYMPHOCYTES # BLD AUTO: 2.22 10*3/MM3 (ref 0.7–3.1)
LYMPHOCYTES NFR BLD AUTO: 21.1 % (ref 19.6–45.3)
MCH RBC QN AUTO: 30.9 PG (ref 26.6–33)
MCHC RBC AUTO-ENTMCNC: 34.4 G/DL (ref 31.5–35.7)
MCV RBC AUTO: 89.8 FL (ref 79–97)
MONOCYTES # BLD AUTO: 0.92 10*3/MM3 (ref 0.1–0.9)
MONOCYTES NFR BLD AUTO: 8.8 % (ref 5–12)
NEUTROPHILS # BLD AUTO: 6.47 10*3/MM3 (ref 1.7–7)
NEUTROPHILS NFR BLD AUTO: 61.5 % (ref 42.7–76)
NRBC BLD AUTO-RTO: 0 /100 WBC (ref 0–0.2)
PLATELET # BLD AUTO: 334 10*3/MM3 (ref 140–450)
PMV BLD AUTO: 11.1 FL (ref 6–12)
POTASSIUM BLD-SCNC: 4.1 MMOL/L (ref 3.5–5.2)
PROT SERPL-MCNC: 7 G/DL (ref 6–8.5)
RBC # BLD AUTO: 4.7 10*6/MM3 (ref 4.14–5.8)
RPR SER QL: NORMAL
SODIUM BLD-SCNC: 141 MMOL/L (ref 136–145)
URATE SERPL-MCNC: 4 MG/DL (ref 3.4–7)
VIT B12 BLD-MCNC: 467 PG/ML (ref 211–946)
WBC NRBC COR # BLD: 10.51 10*3/MM3 (ref 3.4–10.8)

## 2020-05-07 ENCOUNTER — TELEPHONE (OUTPATIENT)
Dept: FAMILY MEDICINE CLINIC | Facility: CLINIC | Age: 40
End: 2020-05-07

## 2020-05-07 DIAGNOSIS — B18.2 HEP C W/O COMA, CHRONIC (HCC): Primary | ICD-10-CM

## 2020-05-07 LAB
HBV DNA SERPL NAA+PROBE-ACNC: NORMAL IU/ML
HBV GENTYP SERPL NAA+PROBE: NORMAL
LOG10 HBV IU/ML: NORMAL
TEST INFORMATION: NORMAL

## 2020-05-07 NOTE — TELEPHONE ENCOUNTER
----- Message from Estrella Chu MD sent at 5/7/2020  2:43 PM EDT -----  Needs to be called back in. See other phone note. Thanks.

## 2020-05-07 NOTE — TELEPHONE ENCOUNTER
----- Message from Estrella Chu MD sent at 5/7/2020  2:32 PM EDT -----  Can you call him and actually let him know we didn't have enough blood for the Hep C? It is active, but we couldn't get enough info on which strain and how many viruses he has. Can he stop by and give up another tube? (Mayelin just came and told me). Also you can let him know that I still have some labs pending, but they all look good otherwise.

## 2020-05-12 ENCOUNTER — HOSPITAL ENCOUNTER (OUTPATIENT)
Dept: GENERAL RADIOLOGY | Facility: HOSPITAL | Age: 40
Discharge: HOME | End: 2020-05-12
Admitting: FAMILY MEDICINE

## 2020-05-12 ENCOUNTER — LAB (OUTPATIENT)
Dept: FAMILY MEDICINE CLINIC | Facility: CLINIC | Age: 40
End: 2020-05-12

## 2020-05-12 DIAGNOSIS — B18.2 HEP C W/O COMA, CHRONIC (HCC): ICD-10-CM

## 2020-05-12 PROCEDURE — 87522 HEPATITIS C REVRS TRNSCRPJ: CPT | Performed by: FAMILY MEDICINE

## 2020-05-12 PROCEDURE — 73562 X-RAY EXAM OF KNEE 3: CPT

## 2020-05-12 PROCEDURE — 73502 X-RAY EXAM HIP UNI 2-3 VIEWS: CPT | Performed by: RADIOLOGY

## 2020-05-12 PROCEDURE — 72100 X-RAY EXAM L-S SPINE 2/3 VWS: CPT

## 2020-05-12 PROCEDURE — 72100 X-RAY EXAM L-S SPINE 2/3 VWS: CPT | Performed by: RADIOLOGY

## 2020-05-12 PROCEDURE — 73562 X-RAY EXAM OF KNEE 3: CPT | Performed by: RADIOLOGY

## 2020-05-12 PROCEDURE — 87902 NFCT AGT GNTYP ALYS HEP C: CPT

## 2020-05-12 PROCEDURE — 73502 X-RAY EXAM HIP UNI 2-3 VIEWS: CPT

## 2020-05-13 NOTE — TELEPHONE ENCOUNTER
Several attempts made to contact pt without answer. Letter mailed to pt stated he needs to contact office.

## 2020-05-24 LAB
HCV GENTYP SERPL NAA+PROBE: 3
HCV GENTYP SERPL NAA+PROBE: NORMAL
HCV RNA SERPL NAA+PROBE-ACNC: NORMAL IU/ML
HCV RNA SERPL NAA+PROBE-LOG IU: 4.66 LOG10 IU/ML
Lab: NORMAL
REF LAB TEST REF RANGE: NORMAL

## 2020-05-25 PROBLEM — F10.24 ALCOHOL DEPENDENCE WITH ALCOHOL-INDUCED MOOD DISORDER (HCC): Status: RESOLVED | Noted: 2018-07-31 | Resolved: 2020-05-25

## 2020-05-25 PROBLEM — F19.91 HISTORY OF ILLICIT DRUG USE: Status: ACTIVE | Noted: 2020-05-25

## 2020-05-25 NOTE — PROGRESS NOTES
"Mayelin Flower     VITALS: Blood pressure 124/70, pulse 75, temperature 98 °F (36.7 °C), temperature source Oral, height 182.9 cm (72\"), weight 87 kg (191 lb 12.8 oz), SpO2 98 %.    Subjective  Chief Complaint:   Chief Complaint   Patient presents with   • Establish Care   • PTSD              History of Present Illness:  Patient is a 39 y.o.  male with a medical history significant for drug abuse, psych disorders, and hepatitis C along with chronic back pain who presents to clinic secondary to establishment of care.  He has several concerns today.  He has not seen a primary care provider in many years.    Patient readily admits that he has a drug abuse history.  He is currently clean.  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  He has several injuries while he was overseas, including falling off a vehicle.  He had injured his lower back along with his right lower extremity.  He has been seen in [place] when he returned but had not really had any follow-up after that.  He continues to have lower back pain along with right hip and right knee pain.  He states that his right leg gives out from underneath him all the time.  He complains of the pain being a sharp, stabbing ache that is worse when he does not change positions often.  Reports a distant history of physical therapy.  Occasionally has numbness and tingling in his right leg.  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  [facility] [place]    Patient also has psych conditions including PTSD, explosive personality disorder, depression, and anxiety.  He has been in and out of psychiatric centers.  Most recently, he completed successfully the IOP program at [facility] with Saranya Dubon.  He states that while he was in the program, he was on Zoloft 150 mg orally daily, Abilify unknown dosage, Vistaril 50 mg orally 4 times a day, and " "prazosin 2 mg 1 to 2 tablets orally every evening.  He did not like the Abilify because it amplified loud noises.  Seroquel made him too sleepy.  He has also failed Risperdal in the past.  He is not sure if he has taken Zyprexa or Latuda.  Since his graduation from the program, he has run out of medications and is currently not on any medications.    Patient does have hepatitis C.  He has never had treatment.  He also would like to be tested for other STDs.  He would like treatment for his hepatitis C.  He denies any abdominal pain, jaundice, nausea, or vomiting.    Patient states that he also has eczema and is currently not on any medications.  He states that he breaks out every so often.  He uses the sunlight to improve his eczema.    No complaints about any of the medications.    The following portions of the patient's history were reviewed and updated as appropriate: allergies, current medications, past family history, past medical history, past social history, past surgical history and problem list.    Past Medical History  Past Medical History:   Diagnosis Date   • Alcohol abuse    • Anxiety    • Back pain    • Chronic pain disorder    • Depression    • PTSD (post-traumatic stress disorder)    • Substance abuse (CMS/[facility])    • Suicidal thoughts    • Suicide attempt (CMS/[facility])     2016-overdose attempt-\"I was shooting fentanyl in an artery.\"       Review of Systems   Constitutional: Negative for chills, fatigue and fever.   HENT: Negative for congestion and rhinorrhea.    Eyes: Negative for discharge and itching.   Respiratory: Negative for shortness of breath and wheezing.    Cardiovascular: Negative for chest pain and palpitations.   Gastrointestinal: Negative for abdominal pain, constipation, diarrhea, nausea and vomiting.   Endocrine: Negative for cold intolerance and heat intolerance.   Genitourinary: Negative for dysuria and hematuria.   Musculoskeletal: Positive for arthralgias, back pain, gait problem and " myalgias.   Skin: Negative for rash and wound.   Neurological: Negative for dizziness and weakness.   Psychiatric/Behavioral: Positive for agitation and decreased concentration. Negative for suicidal ideas. The patient is nervous/anxious.        Surgical History  Past Surgical History:   Procedure Laterality Date   • COLONOSCOPY      >10 years per pt   • EYE SURGERY Right 1998       Family History  Family History   Problem Relation Age of Onset   • Anxiety disorder Mother    • COPD Mother    • Drug abuse Father    • Alcohol abuse Father    • Brain cancer Father    • Anxiety disorder Paternal Aunt    • Anxiety disorder Maternal Grandmother    • Dementia Maternal Grandmother    • Stroke Maternal Grandmother    • Glaucoma Maternal Grandmother    • Macular degeneration Maternal Grandmother    • ADD / ADHD Neg Hx    • Bipolar disorder Neg Hx    • Depression Neg Hx    • OCD Neg Hx    • Paranoid behavior Neg Hx    • Schizophrenia Neg Hx    • Seizures Neg Hx    • Suicide Attempts Neg Hx    • Self-Injurious Behavior  Neg Hx        Social History  Social History     Socioeconomic History   • Marital status:      Spouse name: Not on file   • Number of children: Not on file   • Years of education: Not on file   • Highest education level: Not on file   Tobacco Use   • Smoking status: Current Every Day Smoker     Packs/day: 2.00     Years: 15.00     Pack years: 30.00     Types: Cigarettes   • Smokeless tobacco: Never Used   Substance and Sexual Activity   • Alcohol use: Not Currently     Comment: see below   • Drug use: Yes     Types: Methamphetamines     Comment: etoh   • Sexual activity: Defer       Objective  Physical Exam    Gen: Patient in NAD. Pleasant and answers appropriately. A&Ox3.  Patient has tattoos.    Skin: Warm and dry with normal turgor. No purpura, rashes, or unusual pigmentation noted. Hair is normal in appearance and distribution.    HEENT: NC/AT. No lesions noted. Conjunctiva clear, sclera nonicteric.  PERRL. EOMI without nystagmus or strabismus. Fundi appear benign. No hemorrhages or exudates of eyes. Auditory canals are patent bilaterally without lesions. TMs intact,  nonerythematous, nonbulging without lesions. Nasal mucosa pink, nonerythematous, and nonedematous. Frontal and maxillary sinuses are nontender. O/P nonerythematous and moist without exudate.    Neck: Supple without lymph nodes palpated. FROM.     Lungs: CTA B/L without rales, rhonchi, crackles, or wheezes.    Heart: RRR. S1 and S2 normal. No S3 or S4. No MRGT.    Abd: Soft, nontender,nondistended. (+)BSx4 quadrants.     Back: Tenderness to palpation diffusely over lower back.  Decreased range of motion.    Extrem: No CCE. Radial pulses 2+/4 and equal B/L.  Decreased range of motion with right lower extremity.  Straight leg raise testing benign on left, positive on right.    Neuro: No focal motor/sensory deficits.    Procedures    Assessment/Plan  Mayelin Flower is a 39 y.o. here for establishment of care.  Diagnoses and all orders for this visit:    Chronic post-traumatic stress disorder (PTSD)  -     Ambulatory Referral to Psychiatry  -     sertraline (ZOLOFT) 50 MG tablet; Take 1 tablet by mouth Daily.  -     hydrOXYzine pamoate (VISTARIL) 50 MG capsule; Take 1 capsule by mouth 4 (Four) Times a Day As Needed for Anxiety.  -     prazosin (MINIPRESS) 2 MG capsule; Take 1-2 tablets orally at night.  -     CBC & Differential; Future  -     Comprehensive Metabolic Panel; Future  -     CBC & Differential  -     Comprehensive Metabolic Panel  -     CBC Auto Differential  We will restart patient on a modified list of medications.  Will start patient on 50 mg of Zoloft with intention of increasing to 150 mg.  We will give him hydroxyzine 50 mg orally 4 times a day along with prazosin 2 mg 1 to 2 tablets orally at night.  Patient most likely will need another medication to replace Abilify, but secondary to us referred to psychiatry, will let psychiatry decide  on a new medication for him.    Explosive personality disorder (CMS/HCC)  -     Ambulatory Referral to Psychiatry  -     sertraline (ZOLOFT) 50 MG tablet; Take 1 tablet by mouth Daily.  -     hydrOXYzine pamoate (VISTARIL) 50 MG capsule; Take 1 capsule by mouth 4 (Four) Times a Day As Needed for Anxiety.  -     prazosin (MINIPRESS) 2 MG capsule; Take 1-2 tablets orally at night.  -     CBC & Differential; Future  -     Comprehensive Metabolic Panel; Future  -     CBC & Differential  -     Comprehensive Metabolic Panel  -     CBC Auto Differential  We will restart patient on a modified list of medications.  Will start patient on 50 mg of Zoloft with intention of increasing to 150 mg.  We will give him hydroxyzine 50 mg orally 4 times a day along with prazosin 2 mg 1 to 2 tablets orally at night.  Patient most likely will need another medication to replace Abilify, but secondary to us referred to psychiatry, will let psychiatry decide on a new medication for him.    Hep C w/o coma, chronic (CMS/HCC)  -     Hepatitis Panel, Acute; Future  -     HBV Quant PCR Rfx to Genotype; Future  -     HIV-1/O/2 Ag/Ab w Reflex; Future  -     CBC & Differential; Future  -     Comprehensive Metabolic Panel; Future  -     Hepatitis Panel, Acute  -     HBV Quant PCR Rfx to Genotype  -     HIV-1/O/2 Ag/Ab w Reflex  -     CBC & Differential  -     Comprehensive Metabolic Panel  -     Hepatitis Panel, Acute  -     Hep B Confirmation Tube  -     CBC Auto Differential  -     HCV RNA By PCR, Qn Rfx Nyla; Future  We will start process for hepatitis C clinic.  Check hepatitis C status.    High risk heterosexual behavior  -     Hepatitis Panel, Acute; Future  -     HBV Quant PCR Rfx to Genotype; Future  -     HIV-1/O/2 Ag/Ab w Reflex; Future  -     Chlamydia trachomatis, Neisseria gonorrhoeae, PCR - Urine, Urine, Clean Catch; Future  -     RPR; Future  -     CBC & Differential; Future  -     Comprehensive Metabolic Panel; Future  -     Hepatitis  Panel, Acute  -     HBV Quant PCR Rfx to Genotype  -     HIV-1/O/2 Ag/Ab w Reflex  -     Chlamydia trachomatis, Neisseria gonorrhoeae, PCR - Urine, Urine, Clean Catch  -     RPR  -     CBC & Differential  -     Comprehensive Metabolic Panel  -     Hepatitis Panel, Acute  -     Hep B Confirmation Tube  -     CBC Auto Differential  We will check STDs.     Mild episode of recurrent major depressive disorder (CMS/HCC)  -     sertraline (ZOLOFT) 50 MG tablet; Take 1 tablet by mouth Daily.  -     hydrOXYzine pamoate (VISTARIL) 50 MG capsule; Take 1 capsule by mouth 4 (Four) Times a Day As Needed for Anxiety.  -     prazosin (MINIPRESS) 2 MG capsule; Take 1-2 tablets orally at night.  -     CBC & Differential; Future  -     Comprehensive Metabolic Panel; Future  -     CBC & Differential  -     Comprehensive Metabolic Panel  -     CBC Auto Differential  We will restart patient on a modified list of medications.  Will start patient on 50 mg of Zoloft with intention of increasing to 150 mg.  We will give him hydroxyzine 50 mg orally 4 times a day along with prazosin 2 mg 1 to 2 tablets orally at night.  Patient most likely will need another medication to replace Abilify, but secondary to us referred to psychiatry, will let psychiatry decide on a new medication for him.    Anxiety  -     sertraline (ZOLOFT) 50 MG tablet; Take 1 tablet by mouth Daily.  -     hydrOXYzine pamoate (VISTARIL) 50 MG capsule; Take 1 capsule by mouth 4 (Four) Times a Day As Needed for Anxiety.  -     prazosin (MINIPRESS) 2 MG capsule; Take 1-2 tablets orally at night.  -     CBC & Differential; Future  -     Comprehensive Metabolic Panel; Future  -     CBC & Differential  -     Comprehensive Metabolic Panel  -     CBC Auto Differential  We will restart patient on a modified list of medications.  Will start patient on 50 mg of Zoloft with intention of increasing to 150 mg.  We will give him hydroxyzine 50 mg orally 4 times a day along with prazosin 2  mg 1 to 2 tablets orally at night.  Patient most likely will need another medication to replace Abilify, but secondary to us referred to psychiatry, will let psychiatry decide on a new medication for him.    Chronic bilateral low back pain with right-sided sciatica  -     XR Spine Lumbar 2 or 3 View  -     XR Hip With or Without Pelvis 2 - 3 View Right  -     XR Knee 3 View Right  -     CBC & Differential; Future  -     Comprehensive Metabolic Panel; Future  -     Uric Acid; Future  -     Vitamin B12; Future  -     CBC & Differential  -     Comprehensive Metabolic Panel  -     Uric Acid  -     Vitamin B12  -     CBC Auto Differential  We will start work-up for chronic back pain with x-rays.    Right hip pain  -     XR Hip With or Without Pelvis 2 - 3 View Right  -     XR Knee 3 View Right  -     CBC & Differential; Future  -     Comprehensive Metabolic Panel; Future  -     Uric Acid; Future  -     Vitamin B12; Future  -     CBC & Differential  -     Comprehensive Metabolic Panel  -     Uric Acid  -     Vitamin B12  -     CBC Auto Differential  We will start work-up with x-rays and labs.    Chronic pain of right knee  -     XR Knee 3 View Right  -     CBC & Differential; Future  -     Comprehensive Metabolic Panel; Future  -     Uric Acid; Future  -     Vitamin B12; Future  -     CBC & Differential  -     Comprehensive Metabolic Panel  -     Uric Acid  -     Vitamin B12  -     CBC Auto Differential  We will start work-up with labs and x-rays.    Patient's Body mass index is 26.01 kg/m². BMI is above normal parameters. Recommendations include: exercise counseling and nutrition counseling.       Mayelin Flower  reports that he has been smoking cigarettes. He has a 30.00 pack-year smoking history. He has never used smokeless tobacco.. I have educated him on the risk of diseases from using tobacco products such as cancer, COPD and heart diease.     I advised him to quit and he is not willing to quit.    I spent 3  minutes  counseling the patient.          Findings and plans discussed with patient who verbalizes understanding and agreement. Will followup with patient once results are in. Patient to followup at clinic PRN or in one month for further medical followup.    Estrella Chu MD    EMR Dragon/Transcription Disclaimer:  Much of this encounter note is an electronic transcription/translation of spoken language to printed text.  The electronic translation of spoken language may permit erroneous, or at times, nonsensical words or phrases to be inadvertently transcribed.  Although I have reviewed the note for such errors, some may still exist.

## 2020-06-01 ENCOUNTER — OFFICE VISIT (OUTPATIENT)
Dept: FAMILY MEDICINE CLINIC | Facility: CLINIC | Age: 40
End: 2020-06-01

## 2020-06-01 VITALS
HEIGHT: 72 IN | TEMPERATURE: 98 F | HEART RATE: 72 BPM | DIASTOLIC BLOOD PRESSURE: 80 MMHG | OXYGEN SATURATION: 96 % | SYSTOLIC BLOOD PRESSURE: 126 MMHG | BODY MASS INDEX: 26.74 KG/M2 | WEIGHT: 197.4 LBS

## 2020-06-01 DIAGNOSIS — F33.0 MILD EPISODE OF RECURRENT MAJOR DEPRESSIVE DISORDER (HCC): ICD-10-CM

## 2020-06-01 DIAGNOSIS — F43.12 CHRONIC POST-TRAUMATIC STRESS DISORDER (PTSD): ICD-10-CM

## 2020-06-01 DIAGNOSIS — B18.2 HEP C W/O COMA, CHRONIC (HCC): Primary | ICD-10-CM

## 2020-06-01 DIAGNOSIS — F60.3 EXPLOSIVE PERSONALITY DISORDER (HCC): ICD-10-CM

## 2020-06-01 DIAGNOSIS — M25.859 FEMORAL ACETABULAR IMPINGEMENT: ICD-10-CM

## 2020-06-01 DIAGNOSIS — F41.9 ANXIETY: ICD-10-CM

## 2020-06-01 PROCEDURE — 99214 OFFICE O/P EST MOD 30 MIN: CPT | Performed by: FAMILY MEDICINE

## 2020-06-01 RX ORDER — SERTRALINE HYDROCHLORIDE 100 MG/1
100 TABLET, FILM COATED ORAL DAILY
Qty: 90 TABLET | Refills: 1 | Status: SHIPPED | OUTPATIENT
Start: 2020-06-01 | End: 2020-09-01 | Stop reason: SDUPTHER

## 2020-06-22 NOTE — PROGRESS NOTES
"Mayelin Flower     VITALS: Blood pressure 126/80, pulse 72, temperature 98 °F (36.7 °C), height 182.9 cm (72.01\"), weight 89.5 kg (197 lb 6.4 oz), SpO2 96 %.    Subjective  Chief Complaint:   Chief Complaint   Patient presents with   • PTSD     FOLLOW UP         History of Present Illness:  Patient is a 39 y.o.  male with a medical history significant for drug abuse, psych disorders, and hepatitis C who presents to clinic secondary to medical followup.  No new or acute concerns.  He comes in for follow-up.    Patient has been having lower back pain along with right hip pain and right knee pain.  He states that his right leg gives out from underneath him all the time.  He complains of the pain being a sharp, stabbing ache that is worse when he does not change positions often.  He has numbness and tingling in his right leg.  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  Imaging shows femoral acetabular impingement syndrome.    Patient has PTSD, explosive personality disorder, depression, and anxiety.  He has been in and out of psychiatric centers.  Most recently, he completed successfully the IOP program at [facility] with Saranya Dubon.  He states that while he was in the program, he was on Zoloft 150 mg orally daily, Abilify unknown dosage, Vistaril 50 mg orally 4 times a day, and prazosin 2 mg 1 to 2 tablets orally every evening.  He did not like the Abilify because it amplified loud noises.  Seroquel made him too sleepy.  Has also failed Risperdal in the past.  Not sure if he has taken Zyprexa or Latuda.  At his last visit, we had restarted him on Zoloft 50 mg orally daily, hydroxyzine 50 mg orally 4 times a day, and prazosin 2 mg 1 to 2 tablets orally every evening.  He has since restarted the Zoloft and hydroxyzine.  He did not start the prazosin yet.  He does have pending appointments with psychiatry.  He denies any auditory or visual hallucinations.  He denies any " "suicidal or homicidal ideations.  Sleep and appetite are decreased.  He does not have any energy.  He can get out of bed daily.  He has a hard time forming goals.  Positive anhedonia.  Positive anger and frustration.  He is not crying.    Patient does have hepatitis C and has never had treatment.  He denies any abdominal pain, jaundice, nausea, or vomiting.    Patient has eczema and is currently not on any medications.  He uses the sunlight to improve his eczema.    No complaints about any of the medications.    The following portions of the patient's history were reviewed and updated as appropriate: allergies, current medications, past family history, past medical history, past social history, past surgical history and problem list.    Past Medical History  Past Medical History:   Diagnosis Date   • Alcohol abuse    • Anxiety    • Back pain    • Chronic pain disorder    • Depression    • PTSD (post-traumatic stress disorder)    • Substance abuse (CMS/[facility])    • Suicidal thoughts    • Suicide attempt (CMS/[facility])     2016-overdose attempt-\"I was shooting fentanyl in an artery.\"       Review of Systems   Respiratory: Negative for shortness of breath and wheezing.    Cardiovascular: Negative for chest pain and palpitations.       Surgical History  Past Surgical History:   Procedure Laterality Date   • COLONOSCOPY      >10 years per pt   • EYE SURGERY Right 1998       Family History  Family History   Problem Relation Age of Onset   • Anxiety disorder Mother    • COPD Mother    • Drug abuse Father    • Alcohol abuse Father    • Brain cancer Father    • Anxiety disorder Paternal Aunt    • Anxiety disorder Maternal Grandmother    • Dementia Maternal Grandmother    • Stroke Maternal Grandmother    • Glaucoma Maternal Grandmother    • Macular degeneration Maternal Grandmother    • Coronary artery disease Maternal Grandmother    • ADD / ADHD Neg Hx    • Bipolar disorder Neg Hx    • Depression Neg Hx    • OCD Neg Hx    • Paranoid " behavior Neg Hx    • Schizophrenia Neg Hx    • Seizures Neg Hx    • Suicide Attempts Neg Hx    • Self-Injurious Behavior  Neg Hx        Social History  Social History     Socioeconomic History   • Marital status:      Spouse name: Not on file   • Number of children: Not on file   • Years of education: Not on file   • Highest education level: Not on file   Tobacco Use   • Smoking status: Current Every Day Smoker     Packs/day: 2.00     Years: 15.00     Pack years: 30.00     Types: Cigarettes   • Smokeless tobacco: Never Used   Substance and Sexual Activity   • Alcohol use: Not Currently     Comment: see below   • Drug use: Yes     Types: Methamphetamines     Comment: etoh   • Sexual activity: Defer       Objective  Physical Exam    Gen: Patient in NAD. Pleasant and answers appropriately. A&Ox3.    Skin: Warm and dry with normal turgor. No purpura, rashes, or unusual pigmentation noted. Hair is normal in appearance and distribution.    HEENT: NC/AT. No lesions noted. Conjunctiva clear, sclera nonicteric. PERRL. EOMI without nystagmus or strabismus. Fundi appear benign. No hemorrhages or exudates of eyes. Auditory canals are patent bilaterally without lesions. TMs intact,  nonerythematous, nonbulging without lesions. Nasal mucosa pink, nonerythematous, and nonedematous. Frontal and maxillary sinuses are nontender. O/P nonerythematous and moist without exudate.    Neck: Supple without lymph nodes palpated. FROM.     Lungs: CTA B/L without rales, rhonchi, crackles, or wheezes.    Heart: RRR. S1 and S2 normal. No S3 or S4. No MRGT.    Abd: Soft, nontender,nondistended. (+)BSx4 quadrants.     Extrem: No CCE. Radial pulses 2+/4 and equal B/L.     Neuro: No focal motor/sensory deficits.    Procedures    Assessment/Plan  Mayelin Flower is a 39 y.o. here for medical followup.  Diagnoses and all orders for this visit:    Hep C w/o coma, chronic (CMS/HCC)  -     Ambulatory Referral to Gastroenterology  Will send for  hepatitis C treatment.    Chronic post-traumatic stress disorder (PTSD)  -     sertraline (ZOLOFT) 100 MG tablet; Take 1 tablet by mouth Daily.  Increase Zoloft to 100 mg orally daily.  Continue hydroxyzine 50 mg orally 4 times a day.  Encouraged patient to start prazosin.    Explosive personality disorder (CMS/HCC)  -     sertraline (ZOLOFT) 100 MG tablet; Take 1 tablet by mouth Daily.  Increase Zoloft to 100 mg orally daily.  Continue hydroxyzine 50 mg orally 4 times a day.  Encouraged patient to start prazosin.    Mild episode of recurrent major depressive disorder (CMS/HCC)  -     sertraline (ZOLOFT) 100 MG tablet; Take 1 tablet by mouth Daily.  Increase Zoloft to 100 mg orally daily.  Continue hydroxyzine 50 mg orally 4 times a day.  Encouraged patient to start prazosin.  Pending psychiatric consult.    Anxiety  -     sertraline (ZOLOFT) 100 MG tablet; Take 1 tablet by mouth Daily.  Increase Zoloft to 100 mg orally daily.  Continue hydroxyzine 50 mg orally 4 times a day.  Encourage patient to start prazosin.  Pending psychiatric consult.    Femoral acetabular impingement  -     Ambulatory Referral to Physical Therapy Evaluate and treat  We will send to physical therapy for treatment.    Patient's Body mass index is 26.77 kg/m². BMI is above normal parameters. Recommendations include: exercise counseling and nutrition counseling.       Mayelin Flower  reports that he has been smoking cigarettes. He has a 30.00 pack-year smoking history. He has never used smokeless tobacco.. I have educated him on the risk of diseases from using tobacco products such as cancer, COPD and heart diease.     I advised him to quit and he is not willing to quit.    I spent 3  minutes counseling the patient.          Findings and plans discussed with patient who verbalizes understanding and agreement. Will followup with patient once results are in. Patient to followup at clinic PRN or in one month for further medical followup.    Estrella PEARCE  MD VICKIE Chu Dragon/Transcription Disclaimer:  Much of this encounter note is an electronic transcription/translation of spoken language to printed text.  The electronic translation of spoken language may permit erroneous, or at times, nonsensical words or phrases to be inadvertently transcribed.  Although I have reviewed the note for such errors, some may still exist.

## 2020-07-23 ENCOUNTER — HOSPITAL ENCOUNTER (EMERGENCY)
Facility: HOSPITAL | Age: 40
Discharge: HOME | End: 2020-07-23
Attending: EMERGENCY MEDICINE | Admitting: EMERGENCY MEDICINE

## 2020-07-23 ENCOUNTER — APPOINTMENT (OUTPATIENT)
Dept: GENERAL RADIOLOGY | Facility: HOSPITAL | Age: 40
End: 2020-07-23

## 2020-07-23 DIAGNOSIS — M25.521 RIGHT ELBOW PAIN: ICD-10-CM

## 2020-07-23 DIAGNOSIS — T07.XXXA MULTIPLE CONTUSIONS: ICD-10-CM

## 2020-07-23 DIAGNOSIS — R07.89 CHEST WALL PAIN: ICD-10-CM

## 2020-07-23 DIAGNOSIS — M79.631 RIGHT FOREARM PAIN: ICD-10-CM
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
## 2020-07-23 PROCEDURE — 99283 EMERGENCY DEPT VISIT LOW MDM: CPT

## 2020-07-23 PROCEDURE — 73090 X-RAY EXAM OF FOREARM: CPT

## 2020-07-23 PROCEDURE — 73070 X-RAY EXAM OF ELBOW: CPT

## 2020-07-23 PROCEDURE — 71045 X-RAY EXAM CHEST 1 VIEW: CPT

## 2020-07-23 RX ORDER — OXYCODONE HYDROCHLORIDE AND ACETAMINOPHEN 5; 325 MG/1; MG/1
1 TABLET ORAL ONCE
Status: DISCONTINUED | OUTPATIENT
Start: 2020-07-23 | End: 2020-07-24 | Stop reason: HOSPADM

## 2020-07-23 RX ORDER — IBUPROFEN 400 MG/1
800 TABLET ORAL ONCE
Status: COMPLETED | OUTPATIENT
Start: 2020-07-23 | End: 2020-07-23

## 2020-07-23 RX ADMIN — IBUPROFEN 800 MG: 400 TABLET, FILM COATED ORAL at 22:20

## 2020-07-24 VITALS
RESPIRATION RATE: 16 BRPM | SYSTOLIC BLOOD PRESSURE: 149 MMHG | HEART RATE: 105 BPM | OXYGEN SATURATION: 96 % | WEIGHT: 205 LBS | BODY MASS INDEX: 27.77 KG/M2 | DIASTOLIC BLOOD PRESSURE: 84 MMHG | HEIGHT: 72 IN | TEMPERATURE: 99.1 F
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
## 2020-07-24 NOTE — ED NOTES
Pt resting quietly on stretcher with no complaints.  Pt AAOx4 with no resp distress noted, respirations even and unlabored.  Pt denies any needs at this time.  Skin PWD. Will continue to monitor and follow plan of care.   bed in lowest position, call light in reach.         Cait Thao RN  07/23/20 3266

## 2020-07-24 NOTE — ED NOTES
A&Ox4. Reports improvement in pain. NADN. Pt ambulatory to car with family. Instructed to come back if worsening symptoms. Instructed to follow up with PCP.     Cait Thao RN  07/23/20 1474

## 2020-09-01 ENCOUNTER — OFFICE VISIT (OUTPATIENT)
Dept: PSYCHIATRY | Facility: CLINIC | Age: 40
End: 2020-09-01

## 2020-09-01 VITALS
WEIGHT: 193 LBS | BODY MASS INDEX: 26.14 KG/M2 | HEART RATE: 62 BPM | DIASTOLIC BLOOD PRESSURE: 73 MMHG | SYSTOLIC BLOOD PRESSURE: 134 MMHG | TEMPERATURE: 98 F | HEIGHT: 72 IN

## 2020-09-01 DIAGNOSIS — F15.21 METHAMPHETAMINE USE DISORDER, MODERATE, IN EARLY REMISSION (HCC): ICD-10-CM

## 2020-09-01 DIAGNOSIS — F33.0 MILD EPISODE OF RECURRENT MAJOR DEPRESSIVE DISORDER (HCC): ICD-10-CM

## 2020-09-01 DIAGNOSIS — F41.9 ANXIETY: ICD-10-CM

## 2020-09-01 DIAGNOSIS — F43.12 CHRONIC POST-TRAUMATIC STRESS DISORDER (PTSD): ICD-10-CM

## 2020-09-01 DIAGNOSIS — F11.21 OPIOID USE DISORDER, SEVERE, IN EARLY REMISSION (HCC): Primary | ICD-10-CM

## 2020-09-01 PROCEDURE — 90792 PSYCH DIAG EVAL W/MED SRVCS: CPT | Performed by: NURSE PRACTITIONER

## 2020-09-01 RX ORDER — BUPROPION HYDROCHLORIDE 150 MG/1
150 TABLET ORAL DAILY
Qty: 30 TABLET | Refills: 1 | Status: SHIPPED | OUTPATIENT
Start: 2020-09-01 | End: 2021-03-17 | Stop reason: SDUPTHER

## 2020-09-01 RX ORDER — SERTRALINE HYDROCHLORIDE 100 MG/1
TABLET, FILM COATED ORAL
Qty: 60 TABLET | Refills: 1 | Status: SHIPPED | OUTPATIENT
Start: 2020-09-01 | End: 2021-01-11 | Stop reason: SDUPTHER
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
## 2020-10-21 ENCOUNTER — APPOINTMENT (OUTPATIENT)
Dept: GENERAL RADIOLOGY | Facility: HOSPITAL | Age: 40
End: 2020-10-21

## 2020-10-21 ENCOUNTER — HOSPITAL ENCOUNTER (EMERGENCY)
Facility: HOSPITAL | Age: 40
Discharge: HOME | End: 2020-10-21
Attending: EMERGENCY MEDICINE | Admitting: FAMILY MEDICINE

## 2020-10-21 VITALS
DIASTOLIC BLOOD PRESSURE: 51 MMHG | WEIGHT: 195 LBS | TEMPERATURE: 99.1 F | BODY MASS INDEX: 25.03 KG/M2 | RESPIRATION RATE: 18 BRPM | OXYGEN SATURATION: 97 % | SYSTOLIC BLOOD PRESSURE: 109 MMHG | HEIGHT: 74 IN | HEART RATE: 67 BPM

## 2020-10-21 DIAGNOSIS — M79.671 BILATERAL FOOT PAIN: Primary | ICD-10-CM

## 2020-10-21 DIAGNOSIS — M79.672 BILATERAL FOOT PAIN: Primary | ICD-10-CM

## 2020-10-21 PROCEDURE — 73620 X-RAY EXAM OF FOOT: CPT

## 2020-10-21 PROCEDURE — 73620 X-RAY EXAM OF FOOT: CPT | Performed by: RADIOLOGY

## 2020-10-21 PROCEDURE — 99283 EMERGENCY DEPT VISIT LOW MDM: CPT

## 2020-10-21 RX ORDER — IBUPROFEN 400 MG/1
800 TABLET ORAL ONCE
Status: COMPLETED | OUTPATIENT
Start: 2020-10-21 | End: 2020-10-21

## 2020-10-21 RX ORDER — DICLOFENAC SODIUM 75 MG/1
75 TABLET, DELAYED RELEASE ORAL 2 TIMES DAILY
Qty: 30 TABLET | Refills: 0 | Status: SHIPPED | OUTPATIENT
Start: 2020-10-21 | End: 2021-01-11 | Stop reason: SDUPTHER

## 2020-10-21 RX ADMIN — IBUPROFEN 800 MG: 400 TABLET ORAL at 18:15

## 2020-10-21 NOTE — ED PROVIDER NOTES
"Subjective   39-year-old female presents to the ER with complaints of bilateral foot pain.  Patient states symptoms have started since he was wearing steel toe boots.  Patient denies any history of injury.  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          Review of Systems   Constitutional: Negative.  Negative for fever.   HENT: Negative.    Respiratory: Negative.    Cardiovascular: Negative.  Negative for chest pain.   Gastrointestinal: Negative.  Negative for abdominal pain.   Endocrine: Negative.    Genitourinary: Negative.  Negative for dysuria.   Musculoskeletal: Positive for arthralgias.   Skin: Negative.    Neurological: Negative.    Psychiatric/Behavioral: Negative.    All other systems reviewed and are negative.      Past Medical History:   Diagnosis Date   • Alcohol abuse    • Anxiety    • Back pain    • Chronic pain disorder    • Depression    • PTSD (post-traumatic stress disorder)    • Substance abuse (CMS/[facility])    • Suicidal thoughts    • Suicide attempt (CMS/[facility])     2016-overdose attempt-\"I was shooting fentanyl in an artery.\"       No Known Allergies    Past Surgical History:   Procedure Laterality Date   • COLONOSCOPY      >10 years per pt   • EYE SURGERY Right 1998       Family History   Problem Relation Age of Onset   • Anxiety disorder Mother    • COPD Mother    • Drug abuse Father    • Alcohol abuse Father    • Brain cancer Father    • Anxiety disorder Paternal Aunt    • Anxiety disorder Maternal Grandmother    • Dementia Maternal Grandmother    • Stroke Maternal Grandmother    • Glaucoma Maternal Grandmother    • Macular degeneration Maternal Grandmother    • Coronary artery disease Maternal Grandmother    • ADD / ADHD Neg Hx    • Bipolar disorder Neg Hx    • Depression Neg Hx    • OCD Neg Hx    • Paranoid behavior Neg Hx    • Schizophrenia Neg Hx    • Seizures Neg Hx    • Suicide Attempts Neg Hx    • Self-Injurious Behavior  Neg Hx        Social History "     Socioeconomic History   • Marital status:      Spouse name: Not on file   • Number of children: Not on file   • Years of education: Not on file   • Highest education level: Not on file   Tobacco Use   • Smoking status: Current Every Day Smoker     Packs/day: 2.00     Years: 15.00     Pack years: 30.00     Types: Cigarettes   • Smokeless tobacco: Never Used   Substance and Sexual Activity   • Alcohol use: Not Currently     Comment: see below   • Drug use: Yes     Types: Methamphetamines     Comment: etoh   • Sexual activity: Defer           Objective   Physical Exam  Vitals signs and nursing note reviewed.   Constitutional:       General: He is not in acute distress.     Appearance: He is well-developed. He is not diaphoretic.   HENT:      Head: Normocephalic and atraumatic.      Right Ear: External ear normal.      Left Ear: External ear normal.      Nose: Nose normal.   Eyes:      Conjunctiva/sclera: Conjunctivae normal.   Neck:      Musculoskeletal: Normal range of motion and neck supple.      Vascular: No JVD.      Trachea: No tracheal deviation.   Cardiovascular:      Rate and Rhythm: Normal rate and regular rhythm.      Heart sounds: Normal heart sounds. No murmur.   Pulmonary:      Effort: Pulmonary effort is normal. No respiratory distress.      Breath sounds: No wheezing.   Abdominal:      Palpations: Abdomen is soft.      Tenderness: There is no abdominal tenderness.   Musculoskeletal:         General: Tenderness (bilateral feet. ) present. No deformity.   Skin:     General: Skin is warm and dry.      Coloration: Skin is not pale.      Findings: No erythema or rash.   Neurological:      Mental Status: He is alert and oriented to person, place, and time.      Cranial Nerves: No cranial nerve deficit.   Psychiatric:         Behavior: Behavior normal.         Thought Content: Thought content normal.         Procedures           ED Course  ED Course as of Oct 21 1908   Wed Oct 21, 2020   1837 XR rad  interpreted:  No acute or destructive bony abnormality.    [RB]      ED Course User Index  [RB] Yury Trimble II, PA                                           MDM  Number of Diagnoses or Management Options  Bilateral foot pain: new and requires workup     Amount and/or Complexity of Data Reviewed  Tests in the radiology section of CPT®: ordered and reviewed    Risk of Complications, Morbidity, and/or Mortality  Presenting problems: low  Diagnostic procedures: low  Management options: low    Patient Progress  Patient progress: stable      Final diagnoses:   Bilateral foot pain            Yury Trimble II, PA  10/21/20 1636

## 2021-01-11 ENCOUNTER — OFFICE VISIT (OUTPATIENT)
Dept: FAMILY MEDICINE CLINIC | Facility: CLINIC | Age: 41
End: 2021-01-11

## 2021-01-11 VITALS
TEMPERATURE: 95.6 F | BODY MASS INDEX: 24.79 KG/M2 | SYSTOLIC BLOOD PRESSURE: 120 MMHG | WEIGHT: 193.2 LBS | DIASTOLIC BLOOD PRESSURE: 70 MMHG | OXYGEN SATURATION: 94 % | HEIGHT: 74 IN | HEART RATE: 72 BPM

## 2021-01-11 DIAGNOSIS — F41.9 ANXIETY: ICD-10-CM

## 2021-01-11 DIAGNOSIS — F43.12 CHRONIC POST-TRAUMATIC STRESS DISORDER (PTSD): ICD-10-CM

## 2021-01-11 DIAGNOSIS — B18.2 HEP C W/O COMA, CHRONIC (HCC): ICD-10-CM

## 2021-01-11 DIAGNOSIS — F33.0 MILD EPISODE OF RECURRENT MAJOR DEPRESSIVE DISORDER (HCC): ICD-10-CM

## 2021-01-11 DIAGNOSIS — M25.859 FEMORAL ACETABULAR IMPINGEMENT: Primary | ICD-10-CM

## 2021-01-11 PROCEDURE — 99214 OFFICE O/P EST MOD 30 MIN: CPT | Performed by: FAMILY MEDICINE

## 2021-01-11 RX ORDER — IBUPROFEN 400 MG/1
400 TABLET ORAL EVERY 6 HOURS PRN
COMMUNITY
End: 2021-07-15

## 2021-01-11 RX ORDER — SERTRALINE HYDROCHLORIDE 100 MG/1
TABLET, FILM COATED ORAL
Qty: 60 TABLET | Refills: 2 | Status: SHIPPED | OUTPATIENT
Start: 2021-01-11 | End: 2021-06-17 | Stop reason: SDUPTHER

## 2021-01-11 RX ORDER — DICLOFENAC SODIUM 75 MG/1
75 TABLET, DELAYED RELEASE ORAL 2 TIMES DAILY
Qty: 60 TABLET | Refills: 0 | Status: SHIPPED | OUTPATIENT
Start: 2021-01-11 | End: 2021-03-05 | Stop reason: SDUPTHER

## 2021-01-12 ENCOUNTER — TELEPHONE (OUTPATIENT)
Dept: FAMILY MEDICINE CLINIC | Facility: CLINIC | Age: 41
End: 2021-01-12

## 2021-01-12 NOTE — TELEPHONE ENCOUNTER
----- Message from Estrella Chu MD sent at 1/12/2021  2:43 PM EST -----  Please call Mayelin. Let him know I found the perfect therapist for him. She is Syl Nguyen at [facility] and specializes in PTSD. She works a lot with vets and she works a lot with people who has had troubled childhoods and has problems with self-worth. I think this may be a perfect fit. I really would like him to try her out. Is that okay with him? Thanks.    Left a message to return call.    Left a second message to return call.    Spoke with patient & he is agreeable to referral.

## 2021-01-15 DIAGNOSIS — F41.9 ANXIETY: ICD-10-CM

## 2021-01-15 DIAGNOSIS — F43.12 CHRONIC POST-TRAUMATIC STRESS DISORDER (PTSD): Primary | ICD-10-CM

## 2021-01-15 DIAGNOSIS — F33.0 MILD EPISODE OF RECURRENT MAJOR DEPRESSIVE DISORDER (HCC): ICD-10-CM

## 2021-01-20 ENCOUNTER — TREATMENT (OUTPATIENT)
Dept: PHYSICAL THERAPY | Facility: CLINIC | Age: 41
End: 2021-01-20

## 2021-01-20 DIAGNOSIS — M25.859 FEMORAL ACETABULAR IMPINGEMENT: Primary | ICD-10-CM

## 2021-01-20 PROCEDURE — 97162 PT EVAL MOD COMPLEX 30 MIN: CPT | Performed by: PHYSICAL THERAPIST

## 2021-01-20 NOTE — PROGRESS NOTES
Physical Therapy Initial Evaluation and Plan of Care      Patient: Mayelin Flower   : 1980  Diagnosis/ICD-10 Code:  Femoral acetabular impingement [M25.859]  Referring practitioner: Estrella Chu MD  Date of Initial Visit: 2021  Today's Date: 2021  Patient seen for 1 sessions           Subjective Questionnaire: LEFS: 65/80-18.75% impaired      Subjective Evaluation    History of Present Illness  Onset date: .  Mechanism of injury: Patient reports that he has been experiencing right hip pain since .  He notes that he fell in  and believes that was the cause of the hip pain.  He states that he was [place] at that time, and did not seek medical treatment when he returned to the [place].  He reports that he never did experience relief in pain and has noticed pain is worsening over the past couple years.  He notes that he saw MD on 2021 and reported hip pain to MD.  Patient states that he has increased pain and R) LE weakness; he notes that he has not noticed a major difference in hip ROM.  He reports that he has a 20 minute sitting tolerance, a 30 minute standing tolerance, and a 30 minute walking tolerance.  Patient reports that he had x-rays performed, but is unsure of results.  He reports that MD recommended patient try physical therapy.      Pain  Current pain rating: 3  At best pain rating: 3  At worst pain ratin  Location: R) hip  Quality: dull ache  Relieving factors: change in position, rest, heat, ice and medications  Aggravating factors: standing, ambulation, prolonged positioning, movement, repetitive movement, stairs and squatting    Patient Goals  Patient goals for therapy: decreased pain             Objective          Palpation     Right Tenderness of the gluteus rod, gluteus medius, iliopsoas and piriformis.     Tenderness     Right Hip   Tenderness in the PSIS and sacroiliac joint. No tenderness in the ASIS, greater trochanter and iliac crest.     Active  Range of Motion   Left Hip   Flexion: 105 degrees   Abduction: 13 degrees   External rotation (90/90): 43 degrees   Internal rotation (90/90): 20 degrees     Right Hip   Flexion: 95 degrees   Abduction: 9 degrees   External rotation (90/90): 23 degrees   Internal rotation (90/90): 18 degrees     Strength/Myotome Testing     Left Hip   Planes of Motion   Flexion: 4+  Abduction: 4  Adduction: 4  External rotation: 4  Internal rotation: 4    Right Hip   Planes of Motion   Flexion: 3+  Abduction: 3+  Adduction: 3+  External rotation: 3+  Internal rotation: 3+    Left Knee   Flexion: 4+  Extension: 4+    Right Knee   Flexion: 4-  Extension: 4-    Tests     Left Hip   Positive long sit.     Right Hip   Positive DANA, FADIR and long sit.   Negative scour.     Additional Tests Details  Long Sit Test-right posterior innominate rotation          Assessment & Plan     Assessment  Impairments: abnormal gait, abnormal or restricted ROM, activity intolerance, impaired physical strength, lacks appropriate home exercise program, pain with function and weight-bearing intolerance  Assessment details: Patient is a 40 year old male who comes to physical therapy for femoral acetabular impingement. The patient presents with increased pain, decreased right hip ROM, and decreased LE strength. Positive special tests included FADIR Test, which indicates hip impingement; patient also displayed a positive Long Sit Test, indicating SI joint dysfunction. Patient reports an 18.75% functional mobility impairment, based on the patient's response to the LEFS.  Patient will benefit from skilled PT, so that patient can achieve maximum level of function.     Prognosis: good  Functional Limitations: sleeping, walking, pulling, pushing, uncomfortable because of pain, moving in bed, sitting, standing and stooping  Goals  Plan Goals: SHORT TERM GOALS:  4 weeks       1. Patient will be independent/compliant with HEP.  2. Right hip flexion ROM will improve  by at least 5 degrees to allow for greater ease with ADLs.   3. Patient will report pain no greater than 3/10 for the right hip when performing self-care activities.  4. Patient will report right hip pain pain no greater than 3/10 when sitting to travel community distances.    LONG TERM GOALS:   12 weeks  1. Pt will report at least 70/ 80 on LEFS to show improved functional mobility.   2. Pt will demonstrate right hip strength of 4/5 or greater to improve safety with ambulation on uneven surfaces  3. Pt to report being able to walk for 45 minutes without increasing pain in the right hip.  4. Right hip ROM will improve to within 3 degrees of unaffected hip to allow for greater ease with ADLs.      Plan  Therapy options: will be seen for skilled physical therapy services  Planned modality interventions: cryotherapy, electrical stimulation/Russian stimulation, TENS, ultrasound and thermotherapy (hydrocollator packs)  Planned therapy interventions: manual therapy, ADL retraining, balance/weight-bearing training, neuromuscular re-education, orthotic fitting/training, soft tissue mobilization, flexibility, spinal/joint mobilization, functional ROM exercises, strengthening, gait training, stretching, home exercise program, therapeutic activities, IADL retraining, transfer training and joint mobilization  Duration in visits: 20  Duration in weeks: 12  Treatment plan discussed with: patient  Plan details: Moderate Evaluation  85200  Re-evaluation   73155    Therapeutic exercise  64329  Therapeutic activity    98054  Neuromuscular re-education   23149  Manual therapy   77468  Gait training  26002    Unattended e-stim (Medicaid/Medicare)     Moist heat/cryotherapy 05550   Ultrasound   65651            Visit Diagnoses:    ICD-10-CM ICD-9-CM   1. Femoral acetabular impingement  M25.859 719.85       Timed:  Manual Therapy:    5     mins  04936;  Therapeutic Exercise:         mins  25476;     Neuromuscular Hayder:        mins   39403;    Therapeutic Activity:          mins  24621;     Gait Training:           mins  03174;     Ultrasound:          mins  72506;    Electrical Stimulation:         mins  22513 ( );    Untimed:  Electrical Stimulation:         mins  92729 ( );  Mechanical Traction:         mins  29061;     Timed Treatment:   5   mins   Total Treatment:     37   mins    PT SIGNATURE: Shana Chapman, PT   DATE TREATMENT INITIATED: 1/20/2021    Initial Certification  Certification Period: 4/20/2021  I certify that the therapy services are furnished while this patient is under my care.  The services outlined above are required by this patient, and will be reviewed every 90 days.     PHYSICIAN: Estrella Chu MD      DATE:     Please sign and return via fax to [phone].. Thank you, [facility].

## 2021-01-22 ENCOUNTER — TREATMENT (OUTPATIENT)
Dept: PHYSICAL THERAPY | Facility: CLINIC | Age: 41
End: 2021-01-22

## 2021-01-22 DIAGNOSIS — M25.859 FEMORAL ACETABULAR IMPINGEMENT: Primary | ICD-10-CM

## 2021-01-22 PROCEDURE — 97110 THERAPEUTIC EXERCISES: CPT | Performed by: PHYSICAL THERAPIST

## 2021-01-22 PROCEDURE — 97014 ELECTRIC STIMULATION THERAPY: CPT | Performed by: PHYSICAL THERAPIST

## 2021-01-22 NOTE — PROGRESS NOTES
Physical Therapy Daily Progress Note      Patient: Mayelin Flower   : 1980  Referring practitioner: Estrella Chu MD  Date of Initial Visit: Type: THERAPY  Noted: 2021  Today's Date: 2021  Patient seen for 2 sessions         Mayelin Flower reports that he is having 3/10 pain in his right hip. Patient states that he was sore after working on his home exercises. Patient reports that he was sore after being adjusted after being out of alignment. Patient states that more of his pain is on the back of the right hip.      Objective   See Exercise, Manual, and Modality Logs for complete treatment.       Assessment/Plan   Patient tolerated treatment session well with rest breaks taken as needed by the patient. Educated patient to perform therex per her tolerance, patient verbalized understanding. New therex added per the patient's tolerance, patient demonstrated and understood new therex with no increase in pain noted. Patient's SI region was right posteriorly rotated and was adjusted with MET's by Shana Chapman, PT, DPT. Continue per PT's POC, progress per the patient's tolerance.    Visit Diagnoses:    ICD-10-CM ICD-9-CM   1. Femoral acetabular impingement  M25.859 719.85       Progress strengthening /stabilization /functional activity           Timed:  Manual Therapy:    5     mins  82572;  Therapeutic Exercise:    26     mins  38300;     Neuromuscular Hayder:        mins  72988;    Therapeutic Activity:          mins  78539;     Gait Training:           mins  90428;     Ultrasound:          mins  73498;    Electrical Stimulation:         mins  31594 ( );    Untimed:  Electrical Stimulation:    15     mins  63689 ( );  Mechanical Traction:         mins  08483;     Timed Treatment:   31   mins   Total Treatment:     46   mins  Malinda Linares PTA

## 2021-01-27 ENCOUNTER — OFFICE VISIT (OUTPATIENT)
Dept: PHARMACY | Facility: HOSPITAL | Age: 41
End: 2021-01-27

## 2021-01-27 ENCOUNTER — TREATMENT (OUTPATIENT)
Dept: PHYSICAL THERAPY | Facility: CLINIC | Age: 41
End: 2021-01-27

## 2021-01-27 VITALS
HEART RATE: 69 BPM | HEIGHT: 74 IN | DIASTOLIC BLOOD PRESSURE: 77 MMHG | WEIGHT: 195 LBS | OXYGEN SATURATION: 100 % | BODY MASS INDEX: 25.03 KG/M2 | SYSTOLIC BLOOD PRESSURE: 118 MMHG

## 2021-01-27 DIAGNOSIS — B18.2 CHRONIC HEPATITIS C WITHOUT HEPATIC COMA (HCC): Primary | ICD-10-CM

## 2021-01-27 DIAGNOSIS — M25.859 FEMORAL ACETABULAR IMPINGEMENT: Primary | ICD-10-CM

## 2021-01-27 LAB
6-ACETYL MORPHINE: NEGATIVE
ALBUMIN SERPL-MCNC: 4.4 G/DL (ref 3.5–5.2)
ALBUMIN/GLOB SERPL: 1.4 G/DL
ALP SERPL-CCNC: 104 U/L (ref 39–117)
ALPHA-FETOPROTEIN: 3.77 NG/ML (ref 0–8.3)
ALT SERPL W P-5'-P-CCNC: 235 U/L (ref 1–41)
AMPHET+METHAMPHET UR QL: NEGATIVE
ANION GAP SERPL CALCULATED.3IONS-SCNC: 7.6 MMOL/L (ref 5–15)
AST SERPL-CCNC: 138 U/L (ref 1–40)
BARBITURATES UR QL SCN: NEGATIVE
BENZODIAZ UR QL SCN: NEGATIVE
BILIRUB SERPL-MCNC: 0.4 MG/DL (ref 0–1.2)
BUN SERPL-MCNC: 8 MG/DL (ref 6–20)
BUN/CREAT SERPL: 10.4 (ref 7–25)
BUPRENORPHINE SERPL-MCNC: NEGATIVE NG/ML
CALCIUM SPEC-SCNC: 10.1 MG/DL (ref 8.6–10.5)
CANNABINOIDS SERPL QL: NEGATIVE
CHLORIDE SERPL-SCNC: 102 MMOL/L (ref 98–107)
CO2 SERPL-SCNC: 27.4 MMOL/L (ref 22–29)
COCAINE UR QL: NEGATIVE
CREAT SERPL-MCNC: 0.77 MG/DL (ref 0.76–1.27)
DEPRECATED RDW RBC AUTO: 38.4 FL (ref 37–54)
ERYTHROCYTE [DISTWIDTH] IN BLOOD BY AUTOMATED COUNT: 11.8 % (ref 12.3–15.4)
GFR SERPL CREATININE-BSD FRML MDRD: 112 ML/MIN/1.73
GLOBULIN UR ELPH-MCNC: 3.1 GM/DL
GLUCOSE SERPL-MCNC: 81 MG/DL (ref 65–99)
HBV SURFACE AB SER RIA-ACNC: REACTIVE
HCT VFR BLD AUTO: 49.2 % (ref 37.5–51)
HGB BLD-MCNC: 17.1 G/DL (ref 13–17.7)
HIV1+2 AB SER QL: NORMAL
INR PPP: 0.86 (ref 0.9–1.1)
MCH RBC QN AUTO: 31.1 PG (ref 26.6–33)
MCHC RBC AUTO-ENTMCNC: 34.8 G/DL (ref 31.5–35.7)
MCV RBC AUTO: 89.5 FL (ref 79–97)
METHADONE UR QL SCN: NEGATIVE
OPIATES UR QL: NEGATIVE
OXYCODONE UR QL SCN: NEGATIVE
PCP UR QL SCN: NEGATIVE
PLATELET # BLD AUTO: 327 10*3/MM3 (ref 140–450)
PMV BLD AUTO: 9.9 FL (ref 6–12)
POTASSIUM SERPL-SCNC: 4.3 MMOL/L (ref 3.5–5.2)
PROT SERPL-MCNC: 7.5 G/DL (ref 6–8.5)
PROTHROMBIN TIME: 11.5 SECONDS (ref 11.9–14.1)
RBC # BLD AUTO: 5.5 10*6/MM3 (ref 4.14–5.8)
SODIUM SERPL-SCNC: 137 MMOL/L (ref 136–145)
TSH SERPL DL<=0.05 MIU/L-ACNC: 1.7 UIU/ML (ref 0.27–4.2)
WBC # BLD AUTO: 9.78 10*3/MM3 (ref 3.4–10.8)

## 2021-01-27 PROCEDURE — 80307 DRUG TEST PRSMV CHEM ANLYZR: CPT

## 2021-01-27 PROCEDURE — 87902 NFCT AGT GNTYP ALYS HEP C: CPT

## 2021-01-27 PROCEDURE — 86708 HEPATITIS A ANTIBODY: CPT

## 2021-01-27 PROCEDURE — 86706 HEP B SURFACE ANTIBODY: CPT

## 2021-01-27 PROCEDURE — 99214 OFFICE O/P EST MOD 30 MIN: CPT | Performed by: PHYSICIAN ASSISTANT

## 2021-01-27 PROCEDURE — 80050 GENERAL HEALTH PANEL: CPT

## 2021-01-27 PROCEDURE — 36415 COLL VENOUS BLD VENIPUNCTURE: CPT

## 2021-01-27 PROCEDURE — G0432 EIA HIV-1/HIV-2 SCREEN: HCPCS

## 2021-01-27 PROCEDURE — 81596 NFCT DS CHRNC HCV 6 ASSAYS: CPT

## 2021-01-27 PROCEDURE — 80074 ACUTE HEPATITIS PANEL: CPT

## 2021-01-27 PROCEDURE — 97110 THERAPEUTIC EXERCISES: CPT | Performed by: PHYSICAL THERAPIST

## 2021-01-27 PROCEDURE — 82105 ALPHA-FETOPROTEIN SERUM: CPT

## 2021-01-27 PROCEDURE — 87522 HEPATITIS C REVRS TRNSCRPJ: CPT

## 2021-01-27 PROCEDURE — 85610 PROTHROMBIN TIME: CPT

## 2021-01-27 PROCEDURE — 86704 HEP B CORE ANTIBODY TOTAL: CPT

## 2021-01-27 NOTE — PROGRESS NOTES
: 1980    Chief Complaint   Patient presents with   • Hepatitis C       Mayelin Flower is a 40 y.o. male who presents to the office today as a consultation from Estrella Chu MD for evaluation of Hepatitis C.    History of Present Illness:  He found out about having Hepatitis C infection approx 1 year ago. He has not had prior treatment for hepatitis. Reports no known personal history of liver disease including other viral hepatitis. There is no known family history of liver disease or cirrhosis. He admits to previous IVDU and intranasal drug use. He has been clean now for approx 90 days. He does have nonprofessional tattoos. Denies having previous alcoholism. He does not currently drink alcohol. He denies current illicit drug use including marijuana. No recent liver imaging. He has not had recent labs. He has had previous vaccinations for Hepatitis A and B (he thinks while he was in the .) He is currently self employed.     Review of Systems   Constitutional: Negative for appetite change, chills, fatigue, fever and unexpected weight change.   HENT: Negative for hearing loss, mouth sores and nosebleeds.    Eyes: Negative for itching and visual disturbance.   Respiratory: Negative for cough, chest tightness, shortness of breath and wheezing.    Cardiovascular: Negative for chest pain, palpitations and leg swelling.   Gastrointestinal: Negative for abdominal pain, constipation and diarrhea.   Endocrine: Negative for cold intolerance, heat intolerance, polydipsia and polyuria.   Genitourinary: Negative for dysuria, frequency and hematuria.   Musculoskeletal: Positive for arthralgias (hip pain). Negative for joint swelling and myalgias.   Skin: Negative for rash and wound.   Allergic/Immunologic: Negative for food allergies and immunocompromised state.   Neurological: Negative for seizures, syncope, weakness and light-headedness.   Hematological: Negative for adenopathy. Does not bruise/bleed easily.  "  Psychiatric/Behavioral: Negative for confusion and sleep disturbance. The patient is not nervous/anxious.      Past Medical History:   Diagnosis Date   • Alcohol abuse    • Anxiety    • Back pain    • Chronic pain disorder    • Depression    • PTSD (post-traumatic stress disorder)    • Substance abuse (CMS/[facility])    • Suicidal thoughts    • Suicide attempt (CMS/[facility])     2016-overdose attempt-\"I was shooting fentanyl in an artery.\"       Past Surgical History:   Procedure Laterality Date   • COLONOSCOPY      >10 years per pt   • EYE SURGERY Right 1998       Family History   Problem Relation Age of Onset   • Anxiety disorder Mother    • COPD Mother    • Drug abuse Father    • Alcohol abuse Father    • Brain cancer Father    • Anxiety disorder Paternal Aunt    • Anxiety disorder Maternal Grandmother    • Dementia Maternal Grandmother    • Stroke Maternal Grandmother    • Glaucoma Maternal Grandmother    • Macular degeneration Maternal Grandmother    • Coronary artery disease Maternal Grandmother    • ADD / ADHD Neg Hx    • Bipolar disorder Neg Hx    • Depression Neg Hx    • OCD Neg Hx    • Paranoid behavior Neg Hx    • Schizophrenia Neg Hx    • Seizures Neg Hx    • Suicide Attempts Neg Hx    • Self-Injurious Behavior  Neg Hx        Social History     Socioeconomic History   • Marital status:      Spouse name: Not on file   • Number of children: Not on file   • Years of education: Not on file   • Highest education level: Not on file   Tobacco Use   • Smoking status: Current Every Day Smoker     Packs/day: 1.00     Years: 15.00     Pack years: 15.00     Types: Cigarettes   • Smokeless tobacco: Never Used   Substance and Sexual Activity   • Alcohol use: Not Currently     Comment: see below   • Drug use: Yes     Types: Methamphetamines     Comment: etoh   • Sexual activity: Defer       Current Outpatient Medications:   •  buPROPion XL (WELLBUTRIN XL) 150 MG 24 hr tablet, Take 1 tablet by mouth Daily., Disp: 30 " "tablet, Rfl: 1  •  diclofenac (VOLTAREN) 75 MG EC tablet, Take 1 tablet by mouth 2 (Two) Times a Day., Disp: 60 tablet, Rfl: 0  •  hydrOXYzine pamoate (VISTARIL) 50 MG capsule, Take 1 capsule by mouth 4 (Four) Times a Day As Needed for Anxiety., Disp: 120 capsule, Rfl: 2  •  ibuprofen (ADVIL,MOTRIN) 400 MG tablet, Take 400 mg by mouth Every 6 (Six) Hours As Needed for Mild Pain ., Disp: , Rfl:   •  sertraline (ZOLOFT) 100 MG tablet, Take 1 1/2 tablets for 7 days then increase to 2 daily, Disp: 60 tablet, Rfl: 2    Allergies:   Patient has no known allergies.    Vitals:  /77 (BP Location: Left arm, Patient Position: Sitting)   Pulse 69   Ht 188 cm (74\")   Wt 88.5 kg (195 lb)   SpO2 100%   BMI 25.04 kg/m²     Physical Exam  Vitals signs reviewed.   Constitutional:       General: He is not in acute distress.     Appearance: Normal appearance. He is well-developed. He is not ill-appearing or diaphoretic.   HENT:      Head: Normocephalic and atraumatic.      Right Ear: External ear normal.      Left Ear: External ear normal.      Nose: Nose normal.      Mouth/Throat:      Comments: Wearing a mask  Eyes:      General: No scleral icterus.        Right eye: No discharge.         Left eye: No discharge.      Conjunctiva/sclera: Conjunctivae normal.   Neck:      Musculoskeletal: Normal range of motion.      Vascular: No JVD.   Cardiovascular:      Rate and Rhythm: Normal rate and regular rhythm.      Heart sounds: Normal heart sounds. No murmur. No friction rub. No gallop.    Pulmonary:      Effort: Pulmonary effort is normal. No respiratory distress.      Breath sounds: Normal breath sounds. No wheezing or rales.   Chest:      Chest wall: No tenderness.   Abdominal:      General: Bowel sounds are normal. There is no distension.      Palpations: Abdomen is soft. There is no mass.      Tenderness: There is no abdominal tenderness. There is no guarding.   Musculoskeletal: Normal range of motion.         General: No " deformity.   Skin:     General: Skin is warm and dry.      Findings: No erythema or rash.      Comments: tattoos   Neurological:      Mental Status: He is alert and oriented to person, place, and time.      Coordination: Coordination normal.   Psychiatric:         Behavior: Behavior normal.         Thought Content: Thought content normal.         Judgment: Judgment normal.      Comments: Mild anxious affect     Results Review:  Labs 2020: HCV genotype 3, HCV RNA quant 80966, HIV negative, HBV not detected, acute hepatitis panel shows positive hepatitis C antibody, Hep B surf antigen negative, ALT 28, AST 25, alk phos 50, total bilirubin 0.2, , creatinine 0.72, platelets normal at 334,000.    Assessment:  1. Chronic hepatitis C without hepatic coma (CMS/HCC)      Plan:  Orders Placed This Encounter   Procedures   • US Liver   • AFP Tumor Marker   • CBC (No Diff)   • Comprehensive Metabolic Panel   • HCV FibroSURE   • HCV RNA By PCR, Qn Rfx Nyla   • Hepatitis A Antibody, Total   • Hepatitis B Core Antibody, Total   • Hepatitis B Surface Antibody   • Hepatitis Panel, Acute   • HIV-1 / O / 2 Ag / Antibody 4th Generation   • Protime-INR   • TSH   • Urine Drug Screen - Urine, Clean Catch     More recommendations will be made after these results have been reviewed. He will continue to abstain from alcohol and illegal drugs. He will have US liver to determine if any lesions or other liver diseases present. Immunity to Hep A and B will be determined and vaccinations recommended if needed. After review of these results and discussion with with the patient, we will proceed with Hep C therapy and will submit Rx to insurance company for approval. Treatment will depend on fibrosis score and Hep C genotype. When approved, he will  Rx from our pharmacy and start taking exactly as directed. Hep C viral load lab (HCV RNA quant) and CMP will be checked 4 weeks after start of therapy, at end of therapy and 3 months s/p  therapy to determine response to treatment and cure. He will call with concerns.         Return in about 2 weeks (around 2/10/2021) for discussion of results.      Electronically signed 1/28/2021 12:10 KERRI Moffett PA-C  [facility]

## 2021-01-27 NOTE — PROGRESS NOTES
Physical Therapy Daily Treatment Note      Patient: Mayelin Flower   : 1980  Referring practitioner: Estrella Chu MD  Date of Initial Visit: Type: THERAPY  Noted: 2021  Today's Date: 2021  Patient seen for 3 sessions         Subjective:  Patient arrives to therapy w/ reports of 2/10 right hip pain.  Pt states he tolerated last session well w/ no complaints. Pt request to omit Estim during today's tx.      Objective   See Exercise, Manual, and Modality Logs for complete treatment.       Assessment/Plan:  Patient completed today's session w/ reports of decreased pain following, 1/10.  Pt received MH to R) hip to assist w/ pain control and improve mobility f/b therex as listed and conclusion of cyrotherapy.  Therex progressed w/ initiation of standing strengthening activities.  Pt observed w/ good tolerance, and was provided w/ cues and demonstration for proper mechanics.  Pt noted w/ good SI alignment.  Pt continues to benefit from therapy services and will be progressed as tolerated.  Continue w/ PT's POC.     Visit Diagnoses:    ICD-10-CM ICD-9-CM   1. Femoral acetabular impingement  M25.859 719.85       Progress per Plan of Care and Progress strengthening /stabilization /functional activity           Timed:  Manual Therapy:         mins  04990;  Therapeutic Exercise:     36    mins  79739;     Neuromuscular Hayder:        mins  78328;    Therapeutic Activity:          mins  12918;     Gait Training:           mins  68636;     Ultrasound:          mins  57688;    Electrical Stimulation:         mins  33953 ( );    Untimed:  Electrical Stimulation:         mins  97899 ( );  Mechanical Traction:         mins  68640;     Timed Treatment:  36    mins   Total Treatment:    47    mins  Mariana Roque PTA  Physical Therapist

## 2021-01-28 LAB
HAV AB SER QL IA: POSITIVE
HAV IGM SERPL QL IA: ABNORMAL
HBV CORE AB SERPL QL IA: NEGATIVE
HBV CORE IGM SERPL QL IA: ABNORMAL
HBV SURFACE AG SERPL QL IA: ABNORMAL
HCV AB SER DONR QL: REACTIVE

## 2021-01-29 LAB
A2 MACROGLOB SERPL-MCNC: 173 MG/DL (ref 110–276)
ALT SERPL W P-5'-P-CCNC: 287 IU/L (ref 0–55)
APO A-I SERPL-MCNC: 190 MG/DL (ref 101–178)
BILIRUB SERPL-MCNC: 0.5 MG/DL (ref 0–1.2)
FIBROSIS SCORING:: ABNORMAL
FIBROSIS STAGE SERPL QL: ABNORMAL
GGT SERPL-CCNC: 88 IU/L (ref 0–65)
HAPTOGLOB SERPL-MCNC: 133 MG/DL (ref 17–317)
HCV AB SER QL: ABNORMAL
LABORATORY COMMENT REPORT: ABNORMAL
LIVER FIBR SCORE SERPL CALC.FIBROSURE: 0.13 (ref 0–0.21)
NECROINFLAMM ACTIVITY SCORING:: ABNORMAL
NECROINFLAMMATORY ACT GRADE SERPL QL: ABNORMAL
NECROINFLAMMATORY ACT SCORE SERPL: 0.86 (ref 0–0.17)
SERVICE CMNT-IMP: ABNORMAL

## 2021-01-30 LAB
HCV GENTYP SERPL NAA+PROBE: NORMAL
HCV GENTYP SERPL NAA+PROBE: NORMAL
HCV RNA SERPL NAA+PROBE-ACNC: NORMAL IU/ML
HCV RNA SERPL NAA+PROBE-LOG IU: 5.55 LOG10 IU/ML
LABORATORY COMMENT REPORT: NORMAL
REF LAB TEST REF RANGE: NORMAL

## 2021-02-01 NOTE — PROGRESS NOTES
"Mayelin Flower     VITALS: Blood pressure 120/70, pulse 72, temperature 95.6 °F (35.3 °C), temperature source Temporal, height 188 cm (74\"), weight 87.6 kg (193 lb 3.2 oz), SpO2 94 %.    Subjective  Chief Complaint  Hepatitis C (Patient is here for hepatitis c treatment.) and Depression    Subjective          History of Present Illness:  Patient is a 40 y.o.  male with medical conditions significant for hepatitis C and depression who presents to clinic secondary to medical followup.     No complaints about any of the medications.    The following portions of the patient's history were reviewed and updated as appropriate: allergies, current medications, past family history, past medical history, past social history, past surgical history and problem list.    Past Medical History  Past Medical History:   Diagnosis Date   • Alcohol abuse    • Anxiety    • Back pain    • Chronic pain disorder    • Depression    • PTSD (post-traumatic stress disorder)    • Substance abuse (CMS/[facility])    • Suicidal thoughts    • Suicide attempt (CMS/[facility])     2016-overdose attempt-\"I was shooting fentanyl in an artery.\"       Surgical History  Past Surgical History:   Procedure Laterality Date   • COLONOSCOPY      >10 years per pt   • EYE SURGERY Right 1998       Family History  Family History   Problem Relation Age of Onset   • Anxiety disorder Mother    • COPD Mother    • Drug abuse Father    • Alcohol abuse Father    • Brain cancer Father    • Anxiety disorder Paternal Aunt    • Anxiety disorder Maternal Grandmother    • Dementia Maternal Grandmother    • Stroke Maternal Grandmother    • Glaucoma Maternal Grandmother    • Macular degeneration Maternal Grandmother    • Coronary artery disease Maternal Grandmother    • ADD / ADHD Neg Hx    • Bipolar disorder Neg Hx    • Depression Neg Hx    • OCD Neg Hx    • Paranoid behavior Neg Hx    • Schizophrenia Neg Hx    • Seizures Neg Hx    • Suicide Attempts Neg Hx    • Self-Injurious Behavior  Neg Hx " "       Social History  Social History     Socioeconomic History   • Marital status:      Spouse name: Not on file   • Number of children: Not on file   • Years of education: Not on file   • Highest education level: Not on file   Tobacco Use   • Smoking status: Current Every Day Smoker     Packs/day: 1.00     Years: 15.00     Pack years: 15.00     Types: Cigarettes   • Smokeless tobacco: Never Used   Substance and Sexual Activity   • Alcohol use: Not Currently     Comment: see below   • Drug use: Yes     Types: Methamphetamines     Comment: etoh   • Sexual activity: Defer       Objective   Vital Signs:   /70 (BP Location: Right arm, Patient Position: Sitting)   Pulse 72   Temp 95.6 °F (35.3 °C) (Temporal)   Ht 188 cm (74\")   Wt 87.6 kg (193 lb 3.2 oz)   SpO2 94%   BMI 24.81 kg/m²     Physical Exam     Gen: Patient in NAD. Pleasant and answers appropriately. A&Ox3.    Skin: Warm and dry with normal turgor. No purpura, rashes, or unusual pigmentation noted. Hair is normal in appearance and distribution.    HEENT: NC/AT. No lesions noted. Conjunctiva clear, sclera nonicteric. PERRL. EOMI without nystagmus or strabismus. Fundi appear benign. No hemorrhages or exudates of eyes. Auditory canals are patent bilaterally without lesions. TMs intact,  nonerythematous, nonbulging without lesions. Nasal mucosa pink, nonerythematous, and nonedematous. Frontal and maxillary sinuses are nontender. O/P nonerythematous and moist without exudate.    Neck: Supple without lymph nodes palpated. FROM.     Lungs: CTA B/L without rales, rhonchi, crackles, or wheezes.    Heart: RRR. S1 and S2 normal. No S3 or S4. No MRGT.    Abd: Soft, nontender,nondistended. (+)BSx4 quadrants.     Extrem: No CCE. Radial pulses 2+/4 and equal B/L. FROMx4. No bone, joint, or muscle tenderness noted.    Neuro: No focal motor/sensory deficits.    Procedures    Result Review :   The following data was reviewed by: Estrella Chu MD on " 01/11/2021:                Assessment and Plan    Mayelin Flower is a 40 y.o. here for medical followup.    Problem List Items Addressed This Visit        Mental Health    Major depression, recurrent (CMS/HCC)    Relevant Medications    sertraline (ZOLOFT) 100 MG tablet    Other Relevant Orders    Ambulatory Referral to Psychology (Completed)      Other Visit Diagnoses     Femoral acetabular impingement    -  Primary    Relevant Medications    diclofenac (VOLTAREN) 75 MG EC tablet    Other Relevant Orders    Ambulatory Referral to Physical Therapy Evaluate and treat    Chronic post-traumatic stress disorder (PTSD)        Relevant Medications    sertraline (ZOLOFT) 100 MG tablet    Other Relevant Orders    Ambulatory Referral to Psychology (Completed)    Anxiety        Relevant Medications    sertraline (ZOLOFT) 100 MG tablet    Other Relevant Orders    Ambulatory Referral to Psychology (Completed)    Hep C w/o coma, chronic (CMS/HCC)        Relevant Orders    Ambulatory Referral to Gastroenterology            Patient's Body mass index is 24.81 kg/m². BMI is within normal parameters. No follow-up required..       Mayelin Flower  reports that he has been smoking cigarettes. He has a 15.00 pack-year smoking history. He has never used smokeless tobacco.. I have educated him on the risk of diseases from using tobacco products such as cancer, COPD and heart disease.     I advised him to quit and he is not willing to quit.    I spent 3  minutes counseling the patient.                Follow Up   Return in about 4 weeks (around 2/8/2021), or please also get an appt w/ Jaclyn and one with Ngoc for him - he has missed..  Findings and plans discussed with patient who verbalizes understanding and agreement. Will followup with patient once results are in. Patient was given instructions and counseling regarding his condition or for health maintenance advice. Please see specific information pulled into the AVS if appropriate.       Estrella  MD VICKIE Falk/Transcription Disclaimer:  Much of this encounter note is an electronic transcription/translation of spoken language to printed text.

## 2021-02-02 ENCOUNTER — HOSPITAL ENCOUNTER (OUTPATIENT)
Dept: ULTRASOUND IMAGING | Facility: HOSPITAL | Age: 41
End: 2021-02-02

## 2021-02-18 ENCOUNTER — OFFICE VISIT (OUTPATIENT)
Dept: FAMILY MEDICINE CLINIC | Facility: CLINIC | Age: 41
End: 2021-02-18

## 2021-02-18 DIAGNOSIS — F41.9 ANXIETY: ICD-10-CM

## 2021-02-18 DIAGNOSIS — F43.12 CHRONIC POST-TRAUMATIC STRESS DISORDER (PTSD): ICD-10-CM

## 2021-02-18 DIAGNOSIS — F33.0 MILD EPISODE OF RECURRENT MAJOR DEPRESSIVE DISORDER (HCC): ICD-10-CM

## 2021-02-18 DIAGNOSIS — M25.552 LEFT HIP PAIN: Primary | ICD-10-CM

## 2021-02-18 PROCEDURE — 99443 PR PHYS/QHP TELEPHONE EVALUATION 21-30 MIN: CPT | Performed by: FAMILY MEDICINE

## 2021-02-19 ENCOUNTER — TREATMENT (OUTPATIENT)
Dept: PHYSICAL THERAPY | Facility: CLINIC | Age: 41
End: 2021-02-19

## 2021-02-19 DIAGNOSIS — M25.859 FEMORAL ACETABULAR IMPINGEMENT: Primary | ICD-10-CM

## 2021-02-19 PROCEDURE — 97110 THERAPEUTIC EXERCISES: CPT | Performed by: PHYSICAL THERAPIST

## 2021-02-19 NOTE — PROGRESS NOTES
Progress Note      Patient: Mayelin Flower   : 1980  Diagnosis/ICD-10 Code:  Femoral acetabular impingement [M25.859]  Referring practitioner: Estrella Chu MD  Date of Initial Visit: Type: THERAPY  Noted: 2021  Today's Date: 2021  Patient seen for 4 sessions      Subjective:   Subjective Questionnaire: LEFS: 65/80  Clinical Progress: improved  Home Program Compliance: Yes      Subjective Evaluation    History of Present Illness    Subjective comment: Patient notes that he has been unable to attend therapy recently due to starting a new job, but has continued to perform his HEP as he was able.  He states that he has noticed his left hip is bothering him more and notes that his PCP will likely be sending an order for PT.  Patient reports that his right hip has been improving, with patient noting his pain level has improved; he notes that he does not have as much pain with standing and walking.Pain  Current pain ratin  At best pain ratin  At worst pain ratin         Objective          Active Range of Motion   Left Hip   Flexion: 113 degrees   Abduction: 20 degrees   External rotation (90/90): 25 degrees   Internal rotation (90/90): 20 degrees     Right Hip   Flexion: 102 degrees   Abduction: 16 degrees   External rotation (90/90): 25 degrees   Internal rotation (90/90): 20 degrees     Strength/Myotome Testing     Left Hip   Planes of Motion   Flexion: 4  Abduction: 4  Adduction: 4-  External rotation: 4  Internal rotation: 4    Right Hip   Planes of Motion   Flexion: 4-  Abduction: 4  Adduction: 4-  External rotation: 4-  Internal rotation: 4-    Left Knee   Flexion: 4  Extension: 4+    Right Knee   Flexion: 4-  Extension: 4      Assessment & Plan     Assessment  Impairments: abnormal gait, abnormal or restricted ROM, activity intolerance, impaired physical strength, lacks appropriate home exercise program, pain with function and weight-bearing intolerance  Assessment details: Patient  has been attending physical therapy for femoral acetabular impingement; he has attended physical therapy for a total of 4 sessions, dating from 1/20/2021 to 2/19/2021.  Patient has shown improvements with right LE strength and right hip ROM; however, he does continue to display deficits in these areas.  Patient reports decreased pain levels, noting 2/10 pain at best and 4/10 pain at worst.  Patient has met 3/4 STGs.  Patient will continue to benefit from skilled PT so that he can achieve maximum level of function.     Prognosis: good  Functional Limitations: sleeping, walking, pulling, pushing, uncomfortable because of pain, moving in bed, sitting, standing and stooping  Goals  Plan Goals: SHORT TERM GOALS:  4 weeks       1. Patient will be independent/compliant with HEP.  Met  2. Right hip flexion ROM will improve by at least 5 degrees to allow for greater ease with ADLs.   Ongoing, progressing  3. Patient will report pain no greater than 3/10 for the right hip when performing self-care activities.  Met-no worse than 3/10 for ADLs  4. Patient will report right hip pain pain no greater than 3/10 when sitting to travel community distances.  Met-2-3/10 pain     LONG TERM GOALS:   12 weeks  1. Pt will report at least 70/ 80 on LEFS to show improved functional mobility.   Ongoing-65/80  2. Pt will demonstrate right hip strength of 4/5 or greater to improve safety with ambulation on uneven surfaces  Ongoing, progressing  3. Pt to report being able to walk for 45 minutes without increasing pain in the right hip.  Ongoing, progressing  4. Right hip ROM will improve to within 3 degrees of unaffected hip to allow for greater ease with ADLs.  Ongoing, progressinig      Plan  Therapy options: will be seen for skilled physical therapy services  Planned modality interventions: cryotherapy, electrical stimulation/Russian stimulation, TENS, ultrasound and thermotherapy (hydrocollator packs)  Planned therapy interventions: manual  therapy, ADL retraining, balance/weight-bearing training, neuromuscular re-education, orthotic fitting/training, soft tissue mobilization, flexibility, spinal/joint mobilization, functional ROM exercises, strengthening, gait training, stretching, home exercise program, therapeutic activities, IADL retraining, transfer training and joint mobilization  Duration in visits: 16  Duration in weeks: 8  Treatment plan discussed with: patient  Plan details:   Re-evaluation   49534    Therapeutic exercise  77730  Therapeutic activity    43156  Neuromuscular re-education   43693  Manual therapy   72086  Gait training  14388    Unattended e-stim (Medicaid/Medicare)     Moist heat/cryotherapy 74052   Ultrasound   96177            Visit Diagnoses:    ICD-10-CM ICD-9-CM   1. Femoral acetabular impingement  M25.859 719.85       Progress toward previous goals: Ongoing, progressing        Recommendations: Continue as planned  Timeframe: 2 months  Prognosis to achieve goals: good    PT Signature: Shana Chapman, PT      Based upon review of the patient's progress and continued therapy plan, it is my medical opinion that Mayelin Flower should continue physical therapy treatment at [facility]  [address] [place] [id number] [phone].    Signature: __________________________________  Estrella Chu MD    Timed:  Manual Therapy:         mins  08000;  Therapeutic Exercise:    42     mins  47496;     Neuromuscular Hayder:        mins  72955;    Therapeutic Activity:          mins  64343;     Gait Training:           mins  87441;     Ultrasound:          mins  41367;    Electrical Stimulation:         mins  30839 ( );    Untimed:  Electrical Stimulation:         mins  53553 ( );  Mechanical Traction:         mins  04291;   Ice-5 min    Timed Treatment:   42   mins   Total Treatment:     47   mins

## 2021-02-23 ENCOUNTER — TELEPHONE (OUTPATIENT)
Dept: FAMILY MEDICINE CLINIC | Facility: CLINIC | Age: 41
End: 2021-02-23

## 2021-02-23 ENCOUNTER — TREATMENT (OUTPATIENT)
Dept: PHYSICAL THERAPY | Facility: CLINIC | Age: 41
End: 2021-02-23

## 2021-02-23 DIAGNOSIS — M25.859 FEMORAL ACETABULAR IMPINGEMENT: Primary | ICD-10-CM

## 2021-02-23 PROCEDURE — 97110 THERAPEUTIC EXERCISES: CPT | Performed by: PHYSICAL THERAPIST

## 2021-02-23 NOTE — PROGRESS NOTES
Physical Therapy Daily Treatment Note      Patient: Mayelin Flower   : 1980  Referring practitioner: Estrella Chu MD  Date of Initial Visit: Type: THERAPY  Noted: 2021  Today's Date: 2021  Patient seen for 5 sessions         Subjective:  Patient arrives to therapy w/ reports of 2/10 right hip pain.  Pt states his left hip continues to bother him as well, and he is awaiting an order from his PCP.      Objective   See Exercise, Manual, and Modality Logs for complete treatment.       Assessment/Plan:  Patient tolerated treatment well today w/ reports of slight decrease in pain following, 1/10.  Session initiated w/ therex as listed for improved right hip/ LE range of motion, and improved LE strength f/b conclusion of cryotherapy.  Therex progressed w/ additional LE strengthening activities added to program.  Pt was provided w/ cues and demonstration w/ new added therex for proper mechanics and for max benefit.  Pt continues to benefit from therapy services and will be progressed as tolerated.  Continue w/ PT's POC.     Visit Diagnoses:    ICD-10-CM ICD-9-CM   1. Femoral acetabular impingement  M25.859 719.85       Progress per Plan of Care and Progress strengthening /stabilization /functional activity           Timed:  Manual Therapy:         mins  02222;  Therapeutic Exercise:    44     mins  53710;     Neuromuscular Hayder:        mins  47684;    Therapeutic Activity:          mins  24214;     Gait Training:           mins  77166;     Ultrasound:          mins  60996;    Electrical Stimulation:         mins  14169 ( );    Untimed:  Electrical Stimulation:         mins  03217 ( );  Mechanical Traction:         mins  61003;     Timed Treatment:   44   mins   Total Treatment:    50    mins  Mariana Roque PTA  Physical Therapist

## 2021-02-24 ENCOUNTER — HOSPITAL ENCOUNTER (OUTPATIENT)
Dept: ULTRASOUND IMAGING | Facility: HOSPITAL | Age: 41
Discharge: HOME | End: 2021-02-24
Admitting: PHYSICIAN ASSISTANT

## 2021-02-24 DIAGNOSIS — B18.2 CHRONIC HEPATITIS C WITHOUT HEPATIC COMA (HCC): ICD-10-CM

## 2021-02-24 PROCEDURE — 76705 ECHO EXAM OF ABDOMEN: CPT | Performed by: RADIOLOGY

## 2021-02-24 PROCEDURE — 76705 ECHO EXAM OF ABDOMEN: CPT

## 2021-02-25 ENCOUNTER — TREATMENT (OUTPATIENT)
Dept: PHYSICAL THERAPY | Facility: CLINIC | Age: 41
End: 2021-02-25

## 2021-02-25 DIAGNOSIS — M25.859 FEMORAL ACETABULAR IMPINGEMENT: Primary | ICD-10-CM

## 2021-02-25 PROCEDURE — 97110 THERAPEUTIC EXERCISES: CPT | Performed by: PHYSICAL THERAPIST

## 2021-02-25 NOTE — PROGRESS NOTES
Physical Therapy Daily Treatment Note      Patient: Mayelin Flower   : 1980  Referring practitioner: Estrella Chu MD  Date of Initial Visit: Type: THERAPY  Noted: 2021  Today's Date: 2021  Patient seen for 6 sessions         Subjective:  Patient states his hip isn't bothering him that much this morning.  Pt reports 1/10 pain prior to tx.     Objective   See Exercise, Manual, and Modality Logs for complete treatment.       Assessment/Plan:  Patient tolerated today's session well w/ no complaints of pain increase following.  Treatment initiated w/ therex as listed f/b adjustment of SI region, and conclusion of cryotherapy.  Therex progressed w/ strengthening repetitions increased and addition of 2 pound of ankle weight to LAQ.  Pt observed w/ good tolerance.  Pt provided w/ cues as needed for proper form and mechanics w/ new added activities.  Supervising therapist, Shana Chapman, PT, DPT assessed pt's SI region w/ positive findings of L) anterior rotated innominate.  PT corrected w/ MET, and SI mobilizations; pt responded well.  Pt continues to benefit from therapy services and will be progressed as tolerated.  Continue w/ PT's POC.     Visit Diagnoses:    ICD-10-CM ICD-9-CM   1. Femoral acetabular impingement  M25.859 719.85       Progress per Plan of Care and Progress strengthening /stabilization /functional activity           Timed:  Manual Therapy:    5     mins  74472;  Therapeutic Exercise:    40     mins  91028;     Neuromuscular Hayder:        mins  65325;    Therapeutic Activity:          mins  75266;     Gait Training:           mins  31112;     Ultrasound:          mins  90378;    Electrical Stimulation:         mins  78362 ( );    Untimed:  Electrical Stimulation:         mins  87144 ( );  Mechanical Traction:         mins  17316;     Timed Treatment:  45    mins   Total Treatment:    53    mins  Mariana Roque PTA  Physical Therapist

## 2021-03-04 ENCOUNTER — TREATMENT (OUTPATIENT)
Dept: PHYSICAL THERAPY | Facility: CLINIC | Age: 41
End: 2021-03-04

## 2021-03-04 DIAGNOSIS — M25.859 FEMORAL ACETABULAR IMPINGEMENT: Primary | ICD-10-CM

## 2021-03-04 PROCEDURE — 97110 THERAPEUTIC EXERCISES: CPT | Performed by: PHYSICAL THERAPIST

## 2021-03-04 NOTE — PROGRESS NOTES
Physical Therapy Daily Treatment Note      Patient: Mayelin Flower   : 1980  Referring practitioner: Estrella Chu MD  Date of Initial Visit: Type: THERAPY  Noted: 2021  Today's Date: 3/4/2021  Patient seen for 7 sessions         Subjective:  Patient arrives to therapy w/ reports of 1/10 right hip pain.  Pt states his right hip is feeling so much better, however reports the left hip is painful.  Pt has received order for tx of left hip and will be assessed at next visit.      Objective   See Exercise, Manual, and Modality Logs for complete treatment.       Assessment/Plan:  Patient tolerated treatment well today w/ reports of decreased pain following.  Session initiated w/ therex as listed w/ continued focus on improved LE strength, R) hip range of motion, and improved stability.  Supervising therapist, Shana Chapman, PT, DPT assessed pt's SI region w/ left anterior rotation noted.  PT utilized MET and SI mobs to correct; pt observed w/ good tolerance.  Therex progressed w/ repetitions increase, as to pt's tolerance.  Cryotherapy applied at conclusion.  Pt continues to benefit from therapy services and will be progressed as tolerated.  Continue w/ PTs POC.     Visit Diagnoses:    ICD-10-CM ICD-9-CM   1. Femoral acetabular impingement  M25.859 719.85       Progress per Plan of Care and Progress strengthening /stabilization /functional activity           Timed:  Manual Therapy:    5     mins  99501;  Therapeutic Exercise:    39     mins  02513;     Neuromuscular Hayder:        mins  11142;    Therapeutic Activity:          mins  94812;     Gait Training:           mins  57995;     Ultrasound:          mins  30295;    Electrical Stimulation:         mins  87408 ( );    Untimed:  Electrical Stimulation:         mins  09491 ( );  Mechanical Traction:         mins  07956;     Timed Treatment:  44    mins   Total Treatment:     52   mins  Mariana Roque PTA  Physical  Therapist

## 2021-03-05 DIAGNOSIS — M25.859 FEMORAL ACETABULAR IMPINGEMENT: ICD-10-CM

## 2021-03-05 NOTE — TELEPHONE ENCOUNTER
Caller: Mayelin Flower    Relationship: Self    Best call back number: [phone]     Medication needed: PATIENT SAID THAT HE WAS ON AN ANTI INFLAMMATORY BUT DOES NOT KNOW WHAT THE NAME WAS      When do you need the refill by: 03/05    What details did the patient provide when requesting the medication: PATIENT IS OUT OF THIS MEDICATION     Does the patient have less than a 3 day supply:  [x] Yes  [] No    What is the patient's preferred pharmacy: [facility] [address] [facility] [address] [facility] [address] [facility] [address] [facility] [phone]  - [phone] FX

## 2021-03-08 NOTE — PROGRESS NOTES
"Mayelin Flower     VITALS: Heart rate 72, respiratory rate 18, temperature 98.2    Subjective  Chief Complaint: Left hip pain    History of Present Illness:  You have chosen to receive care through a telehealth visit.  Do you consent to use an audio connection for your medical care today? Yes    This visit was conducted with the use of a interactive audio telecommunication system that permits real-time communication between patient and provider.  Patient consent for this virtual visit was obtained before the visit.  The patient was at their home under the 2020 Coronavirus Preparedness and Respond  Supplemental Appropriation Act.  Provider was at the office.    Subjective            Patient is a 40 y.o.  male with medical conditions significant for PTSD, depression, and anxiety who presents to a visit secondary to medical followup.  Patient states that he tripped over his dog last week and landed on his left hip.  His left hip has continued to hurt him.  He does have a history of right hip pain.  Left hip pain is dull and throbbing.  It does not radiate.    Medications for his PTSD, depression, and anxiety are doing well.  He has found a new job with [employer].  Patient is still working on increasing the Zoloft to 150 mg.  He still is on 100 mg daily.  He continues to take the Vistaril for sleep and is doing well on it.    No complaints about any of the medications.    The following portions of the patient's history were reviewed and updated as appropriate: allergies, current medications, past family history, past medical history, past social history, past surgical history and problem list.    Past Medical History  Past Medical History:   Diagnosis Date   • Alcohol abuse    • Anxiety    • Back pain    • Chronic pain disorder    • Depression    • PTSD (post-traumatic stress disorder)    • Substance abuse (CMS/[facility])    • Suicidal thoughts    • Suicide attempt (CMS/[facility])     2016-overdose attempt-\"I was shooting fentanyl " "in an artery.\"       Review of Systems    Surgical History  Past Surgical History:   Procedure Laterality Date   • COLONOSCOPY      >10 years per pt   • EYE SURGERY Right 1998       Family History  Family History   Problem Relation Age of Onset   • Anxiety disorder Mother    • COPD Mother    • Drug abuse Father    • Alcohol abuse Father    • Brain cancer Father    • Anxiety disorder Paternal Aunt    • Anxiety disorder Maternal Grandmother    • Dementia Maternal Grandmother    • Stroke Maternal Grandmother    • Glaucoma Maternal Grandmother    • Macular degeneration Maternal Grandmother    • Coronary artery disease Maternal Grandmother    • ADD / ADHD Neg Hx    • Bipolar disorder Neg Hx    • Depression Neg Hx    • OCD Neg Hx    • Paranoid behavior Neg Hx    • Schizophrenia Neg Hx    • Seizures Neg Hx    • Suicide Attempts Neg Hx    • Self-Injurious Behavior  Neg Hx        Social History  Social History     Socioeconomic History   • Marital status:      Spouse name: Not on file   • Number of children: Not on file   • Years of education: Not on file   • Highest education level: Not on file   Tobacco Use   • Smoking status: Current Every Day Smoker     Packs/day: 1.00     Years: 15.00     Pack years: 15.00     Types: Cigarettes   • Smokeless tobacco: Never Used   Substance and Sexual Activity   • Alcohol use: Not Currently     Comment: see below   • Drug use: Yes     Types: Methamphetamines     Comment: etoh   • Sexual activity: Defer       Objective  Physical Exam   Constitutional: No distress.   Pulmonary/Chest: Effort normal.   Neurological: He is alert.   Psychiatric: He has a normal mood and affect.   Vitals reviewed.      Limited exam due to telehealth visit.    Procedures     Assessment and Plan    Mayelin Flower is a 40 y.o. here for medical followup.    Problem List Items Addressed This Visit        Mental Health    Major depression, recurrent (CMS/HCC)  Continue Wellbutrin  mg orally daily.  We will " try to increase the Zoloft to 150 mg orally daily.      Other Visit Diagnoses     Left hip pain    -  Primary    Home exercises to be mailed to patient.  May need physical therapy.      Chronic post-traumatic stress disorder (PTSD)      Currently on Wellbutrin  mg orally daily and Zoloft 100 mg orally daily.      Anxiety   We will work on increasing Zoloft 250 mg orally daily.  Continue hydroxyzine 50 mg orally up to 4 times a day, but especially at night.           Findings and plans discussed with patient who verbalizes understanding and agreement. Will followup with patient once results are in. Patient to followup at clinic PRN or in  1 month for further medical followup.  Total time of encounter was 26 minutes.    Follow Up   Return in about 4 weeks (around 3/18/2021).  Findings and plans discussed with patient who verbalizes understanding and agreement. Will followup with patient once results are in. Patient was given instructions and counseling regarding his condition or for health maintenance advice. Please see specific information pulled into the AVS if appropriate.       Estrella Chu MD    EMR Dragon/Transcription Disclaimer:  Much of this encounter note is an electronic transcription/translation of spoken language to printed text.

## 2021-03-09 ENCOUNTER — TREATMENT (OUTPATIENT)
Dept: PHYSICAL THERAPY | Facility: CLINIC | Age: 41
End: 2021-03-09

## 2021-03-09 DIAGNOSIS — M25.551 RIGHT HIP PAIN: ICD-10-CM

## 2021-03-09 DIAGNOSIS — M25.552 LEFT HIP PAIN: ICD-10-CM

## 2021-03-09 DIAGNOSIS — M25.859 FEMORAL ACETABULAR IMPINGEMENT: Primary | ICD-10-CM

## 2021-03-09 PROCEDURE — 97110 THERAPEUTIC EXERCISES: CPT | Performed by: PHYSICAL THERAPIST

## 2021-03-09 PROCEDURE — 97161 PT EVAL LOW COMPLEX 20 MIN: CPT | Performed by: PHYSICAL THERAPIST

## 2021-03-09 PROCEDURE — 97530 THERAPEUTIC ACTIVITIES: CPT | Performed by: PHYSICAL THERAPIST

## 2021-03-09 RX ORDER — DICLOFENAC SODIUM 75 MG/1
75 TABLET, DELAYED RELEASE ORAL 2 TIMES DAILY
Qty: 60 TABLET | Refills: 2 | Status: SHIPPED | OUTPATIENT
Start: 2021-03-09

## 2021-03-09 NOTE — PROGRESS NOTES
"Re-Evaluation      Patient: Mayelin Flower   : 1980  Diagnosis/ICD-10 Code:  Femoral acetabular impingement [M25.859]  Referring practitioner: Estrella Chu MD  Date of Initial Visit: Type: THERAPY  Noted: 2021  Today's Date: 3/9/2021  Patient seen for 8 sessions      Subjective:   Subjective Questionnaire: LEFS: 66/80=17.5% impaired  Clinical Progress: Right hip improved; left hip evaluated  Home Program Compliance: Yes      Subjective Evaluation    History of Present Illness  Mechanism of injury: Patient has been attending therapy for treatment of femoral acetabular impingement and right hip pain.  He presents with new order for left hip pain.  Patient notes that he began experiencing left hip pain since late 2020.  Patient notes that he had a fall around that time and believes that caused the hip pain; he notes that when he fell, he landed on the left hip.  He states that he did not immediately seek medical treatment, but notes that pain has continued to worsen over time.  Patient states that it feels like \"something is pulled.\"    Patient notes that the right hip has been doing better with therapy.  He notes that his pain level has been decreasing; he states that his pain is not as bad as it was previously after working.  Patient notes that he is also able to be more active.    Pain  Pain scale: Left hip-2/10, right hip-0/10.  Pain scale at lowest: left hip-2/10, right hip-0/10.  Pain scale at highest: left hip-5/10, right hip-2/10.         Objective          Active Range of Motion   Left Hip   Flexion: 106 degrees   Abduction: 14 degrees   External rotation (90/90): 26 degrees   Internal rotation (90/90): 21 degrees     Right Hip   Flexion: 105 degrees   Abduction: 20 degrees   External rotation (90/90): 22 degrees   Internal rotation (90/90): 22 degrees     Strength/Myotome Testing     Left Hip   Planes of Motion   Flexion: 4  Abduction: 4  Adduction: 4  External rotation: 4  Internal " rotation: 4    Right Hip   Planes of Motion   Flexion: 4+  Abduction: 4  Adduction: 4  External rotation: 4  Internal rotation: 4    Left Knee   Flexion: 4+  Extension: 4+    Right Knee   Flexion: 4  Extension: 4+    Tests     Left Hip   Positive long sit.     Right Hip   Positive DANA, FADIR and long sit.   Negative scour.     Additional Tests Details  Left posterior innominate rotation      Assessment & Plan     Assessment  Impairments: abnormal gait, abnormal or restricted ROM, activity intolerance, impaired physical strength, lacks appropriate home exercise program, pain with function and weight-bearing intolerance  Assessment details: Patient has been attending physical therapy for femoral acetabular impingement and right hip pain; he presents with new order for left hip pain.  Left hip was evaluated at today's session.  Patient displayed positive Long Sit Test, DANA Test, and FADIR; special tests indicate hip impingement and SI joint pathology.  Patient has shown improvements in right LE strength since start of care, but does continue to have LE strength deficits bilaterally.  Patient lacks full ROM in bilateral hips in all directions.  Patient will continue to benefit from skilled PT so that he can achieve maximum level of function.     Prognosis: good  Functional Limitations: sleeping, walking, pulling, pushing, uncomfortable because of pain, moving in bed, sitting, standing and stooping  Goals  Plan Goals: SHORT TERM GOALS:  4 weeks       1. Patient will be independent/compliant with HEP.  Met  2. Right hip flexion ROM will improve by at least 5 degrees to allow for greater ease with ADLs.   Ongoing, progressing  3. Patient will report pain no greater than 3/10 for the right hip when performing self-care activities.  Met-no worse than 3/10 for ADLs  4. Patient will report right hip pain pain no greater than 3/10 when sitting to travel community distances.  Met-2-3/10 pain   5. Patient will achieve 110  degrees of left hip flexion to allow for greater ease with ADLs.  New-goal established on 3/9/2021    LONG TERM GOALS:   12 weeks  1. Pt will report at least 70/ 80 on LEFS to show improved functional mobility.   Ongoing, progressing-66/80  2. Pt will demonstrate right hip strength of 4/5 or greater to improve safety with ambulation on uneven surfaces  Met  3. Pt to report being able to walk for 45 minutes without increasing pain in the right hip.  Ongoing, progressing-reports right hip pain of 2/10 with ambulation of 45 minutes  4. Right hip ROM will improve to within 3 degrees of unaffected hip to allow for greater ease with ADLs.  Goal discontinued-see new hip ROM LTG established on 3/9/2021  5. Patient will display 4+/5 bilateral LE strength to improve safety with ambulation on uneven surfaces.  New-goal established on 3/9/2021  6. Bilateral hip ROM will improve to WFL to allow for greater ease with ADLs.  New-goal established on 3/9/2021  7. Patient will report bilateral hip pain no greater than 2/10 when working for 4 hours.  New-goal established on 3/9/2021    Plan  Therapy options: will be seen for skilled physical therapy services  Planned modality interventions: cryotherapy, electrical stimulation/Russian stimulation, TENS, ultrasound and thermotherapy (hydrocollator packs)  Planned therapy interventions: manual therapy, ADL retraining, balance/weight-bearing training, neuromuscular re-education, orthotic fitting/training, soft tissue mobilization, flexibility, spinal/joint mobilization, functional ROM exercises, strengthening, gait training, stretching, home exercise program, therapeutic activities, IADL retraining, transfer training and joint mobilization  Duration in visits: 16  Duration in weeks: 8  Treatment plan discussed with: patient  Plan details: Re-evaluation   33965    Therapeutic exercise  45354  Therapeutic activity    88984  Neuromuscular re-education   99430  Manual therapy   48926  Gait  training  34785    Unattended e-stim (Medicaid/Medicare)     Moist heat/cryotherapy 52825   Ultrasound   84774            Visit Diagnoses:    ICD-10-CM ICD-9-CM   1. Femoral acetabular impingement  M25.859 719.85   2. Right hip pain  M25.551 719.45   3. Left hip pain  M25.552 719.45       Progress toward previous goals: Ongoing, progressing; new goals established for left hip      Recommendations: Continue as planned  Timeframe: 2 months  Prognosis to achieve goals: good    PT Signature: Shana Chapman, PT      Based upon review of the patient's progress and continued therapy plan, it is my medical opinion that Mayelin Flower should continue physical therapy treatment at [facility]  [address] [place] [id number] [phone].    Signature: __________________________________  Estrella Chu MD    Timed:  Manual Therapy:    5     mins  40137;  Therapeutic Exercise:    34     mins  32706;     Neuromuscular Hayder:        mins  52691;    Therapeutic Activity:     10     mins  47754;     Gait Training:           mins  50489;     Ultrasound:          mins  20895;    Electrical Stimulation:         mins  99704 ( );    Untimed:  Electrical Stimulation:         mins  36975 ( );  Mechanical Traction:         mins  23721;     Timed Treatment:  49   mins   Total Treatment:     63   mins

## 2021-03-15 ENCOUNTER — BULK ORDERING (OUTPATIENT)
Dept: CASE MANAGEMENT | Facility: OTHER | Age: 41
End: 2021-03-15

## 2021-03-15 DIAGNOSIS — Z23 IMMUNIZATION DUE: ICD-10-CM

## 2021-03-15 RX ORDER — BUPROPION HYDROCHLORIDE 150 MG/1
150 TABLET ORAL DAILY
Qty: 30 TABLET | Refills: 1 | Status: CANCELLED | OUTPATIENT
Start: 2021-03-15

## 2021-03-17 ENCOUNTER — OFFICE VISIT (OUTPATIENT)
Dept: FAMILY MEDICINE CLINIC | Facility: CLINIC | Age: 41
End: 2021-03-17

## 2021-03-17 VITALS
BODY MASS INDEX: 25.08 KG/M2 | OXYGEN SATURATION: 97 % | WEIGHT: 195.4 LBS | TEMPERATURE: 97.1 F | HEIGHT: 74 IN | SYSTOLIC BLOOD PRESSURE: 116 MMHG | HEART RATE: 75 BPM | DIASTOLIC BLOOD PRESSURE: 78 MMHG

## 2021-03-17 DIAGNOSIS — M25.859 FEMORAL ACETABULAR IMPINGEMENT: Primary | ICD-10-CM

## 2021-03-17 DIAGNOSIS — F33.0 MILD EPISODE OF RECURRENT MAJOR DEPRESSIVE DISORDER (HCC): ICD-10-CM

## 2021-03-17 DIAGNOSIS — F41.9 ANXIETY: ICD-10-CM

## 2021-03-17 DIAGNOSIS — F60.3 EXPLOSIVE PERSONALITY DISORDER (HCC): ICD-10-CM

## 2021-03-17 DIAGNOSIS — F43.12 CHRONIC POST-TRAUMATIC STRESS DISORDER (PTSD): ICD-10-CM

## 2021-03-17 PROCEDURE — 99214 OFFICE O/P EST MOD 30 MIN: CPT | Performed by: FAMILY MEDICINE

## 2021-03-17 RX ORDER — BUPROPION HYDROCHLORIDE 150 MG/1
150 TABLET ORAL DAILY
Qty: 30 TABLET | Refills: 1 | Status: SHIPPED | OUTPATIENT
Start: 2021-03-17 | End: 2021-03-19 | Stop reason: SDUPTHER

## 2021-03-17 RX ORDER — HYDROXYZINE PAMOATE 50 MG/1
50 CAPSULE ORAL 4 TIMES DAILY PRN
Qty: 120 CAPSULE | Refills: 2 | Status: SHIPPED | OUTPATIENT
Start: 2021-03-17

## 2021-03-19 DIAGNOSIS — F43.12 CHRONIC POST-TRAUMATIC STRESS DISORDER (PTSD): ICD-10-CM

## 2021-03-19 DIAGNOSIS — F33.0 MILD EPISODE OF RECURRENT MAJOR DEPRESSIVE DISORDER (HCC): ICD-10-CM

## 2021-03-19 DIAGNOSIS — F41.9 ANXIETY: ICD-10-CM

## 2021-03-19 DIAGNOSIS — F60.3 EXPLOSIVE PERSONALITY DISORDER (HCC): ICD-10-CM

## 2021-03-23 RX ORDER — BUPROPION HYDROCHLORIDE 150 MG/1
150 TABLET ORAL DAILY
Qty: 30 TABLET | Refills: 2 | Status: SHIPPED | OUTPATIENT
Start: 2021-03-23

## 2021-06-17 ENCOUNTER — OFFICE VISIT (OUTPATIENT)
Dept: FAMILY MEDICINE CLINIC | Facility: CLINIC | Age: 41
End: 2021-06-17

## 2021-06-17 VITALS
OXYGEN SATURATION: 98 % | HEIGHT: 74 IN | SYSTOLIC BLOOD PRESSURE: 122 MMHG | WEIGHT: 183.2 LBS | TEMPERATURE: 97.1 F | HEART RATE: 72 BPM | DIASTOLIC BLOOD PRESSURE: 74 MMHG | BODY MASS INDEX: 23.51 KG/M2

## 2021-06-17 DIAGNOSIS — F43.12 CHRONIC POST-TRAUMATIC STRESS DISORDER (PTSD): ICD-10-CM

## 2021-06-17 DIAGNOSIS — H16.9 KERATITIS: Primary | ICD-10-CM

## 2021-06-17 DIAGNOSIS — R53.83 OTHER FATIGUE: ICD-10-CM

## 2021-06-17 DIAGNOSIS — B18.2 HEP C W/O COMA, CHRONIC (HCC): ICD-10-CM

## 2021-06-17 DIAGNOSIS — B35.4 TINEA CORPORIS: ICD-10-CM

## 2021-06-17 DIAGNOSIS — F33.0 MILD EPISODE OF RECURRENT MAJOR DEPRESSIVE DISORDER (HCC): ICD-10-CM

## 2021-06-17 DIAGNOSIS — F60.3 EXPLOSIVE PERSONALITY DISORDER (HCC): ICD-10-CM

## 2021-06-17 DIAGNOSIS — F41.9 ANXIETY: ICD-10-CM

## 2021-06-17 PROCEDURE — 99214 OFFICE O/P EST MOD 30 MIN: CPT | Performed by: FAMILY MEDICINE

## 2021-06-17 PROCEDURE — 96372 THER/PROPH/DIAG INJ SC/IM: CPT | Performed by: FAMILY MEDICINE

## 2021-06-17 RX ORDER — OFLOXACIN 3 MG/ML
1 SOLUTION/ DROPS OPHTHALMIC
Qty: 3 ML | Refills: 0 | Status: SHIPPED | OUTPATIENT
Start: 2021-06-17 | End: 2021-06-22

## 2021-06-17 RX ORDER — CYANOCOBALAMIN 1000 UG/ML
1000 INJECTION, SOLUTION INTRAMUSCULAR; SUBCUTANEOUS ONCE
Status: COMPLETED | OUTPATIENT
Start: 2021-06-17 | End: 2021-06-17

## 2021-06-17 RX ORDER — CLOTRIMAZOLE AND BETAMETHASONE DIPROPIONATE 10; .64 MG/G; MG/G
CREAM TOPICAL 2 TIMES DAILY
Qty: 45 G | Refills: 0 | Status: SHIPPED | OUTPATIENT
Start: 2021-06-17

## 2021-06-17 RX ORDER — SERTRALINE HYDROCHLORIDE 100 MG/1
TABLET, FILM COATED ORAL
Qty: 75 TABLET | Refills: 2 | Status: SHIPPED | OUTPATIENT
Start: 2021-06-17

## 2021-06-17 RX ADMIN — CYANOCOBALAMIN 1000 MCG: 1000 INJECTION, SOLUTION INTRAMUSCULAR; SUBCUTANEOUS at 12:44

## 2021-06-29 ENCOUNTER — TELEPHONE (OUTPATIENT)
Dept: FAMILY MEDICINE CLINIC | Facility: CLINIC | Age: 41
End: 2021-06-29

## 2021-07-02 DIAGNOSIS — F33.0 MILD EPISODE OF RECURRENT MAJOR DEPRESSIVE DISORDER (HCC): ICD-10-CM

## 2021-07-02 DIAGNOSIS — F41.9 ANXIETY: ICD-10-CM

## 2021-07-02 DIAGNOSIS — F43.12 CHRONIC POST-TRAUMATIC STRESS DISORDER (PTSD): ICD-10-CM

## 2021-07-02 RX ORDER — SERTRALINE HYDROCHLORIDE 100 MG/1
TABLET, FILM COATED ORAL
Qty: 60 TABLET | Refills: 1 | OUTPATIENT
Start: 2021-07-02

## 2021-07-02 NOTE — TELEPHONE ENCOUNTER
Received vm from pt and pt stated on the vm to call [phone] and gave verbal permission to leave message or speak with his mother. I left detailed message stating pt can stop by weekly for b12 injections.

## 2021-07-15 ENCOUNTER — HOSPITAL ENCOUNTER (EMERGENCY)
Facility: HOSPITAL | Age: 41
Discharge: HOME | End: 2021-07-15
Attending: EMERGENCY MEDICINE

## 2021-07-15 VITALS
OXYGEN SATURATION: 98 % | BODY MASS INDEX: 25.06 KG/M2 | HEART RATE: 77 BPM | TEMPERATURE: 98 F | HEIGHT: 72 IN | RESPIRATION RATE: 16 BRPM | WEIGHT: 185 LBS | SYSTOLIC BLOOD PRESSURE: 132 MMHG | DIASTOLIC BLOOD PRESSURE: 80 MMHG

## 2021-07-15 DIAGNOSIS — J02.9 PHARYNGITIS, UNSPECIFIED ETIOLOGY: Primary | ICD-10-CM

## 2021-07-15 LAB
FLUAV RNA RESP QL NAA+PROBE: NOT DETECTED
FLUBV RNA RESP QL NAA+PROBE: NOT DETECTED
S PYO AG THROAT QL: NEGATIVE
SARS-COV-2 RNA RESP QL NAA+PROBE: NOT DETECTED

## 2021-07-15 PROCEDURE — C9803 HOPD COVID-19 SPEC COLLECT: HCPCS

## 2021-07-15 PROCEDURE — 87880 STREP A ASSAY W/OPTIC: CPT | Performed by: PHYSICIAN ASSISTANT

## 2021-07-15 PROCEDURE — 87081 CULTURE SCREEN ONLY: CPT | Performed by: PHYSICIAN ASSISTANT

## 2021-07-15 PROCEDURE — 99282 EMERGENCY DEPT VISIT SF MDM: CPT

## 2021-07-15 PROCEDURE — 87636 SARSCOV2 & INF A&B AMP PRB: CPT | Performed by: PHYSICIAN ASSISTANT

## 2021-07-15 RX ORDER — IBUPROFEN 600 MG/1
600 TABLET ORAL EVERY 6 HOURS PRN
Qty: 15 TABLET | Refills: 0 | Status: SHIPPED | OUTPATIENT
Start: 2021-07-15

## 2021-07-17 LAB — BACTERIA SPEC AEROBE CULT: NORMAL

## 2021-07-19 ENCOUNTER — HOSPITAL ENCOUNTER (EMERGENCY)
Facility: HOSPITAL | Age: 41
Discharge: HOME | End: 2021-07-19
Attending: EMERGENCY MEDICINE | Admitting: EMERGENCY MEDICINE

## 2021-07-19 ENCOUNTER — APPOINTMENT (OUTPATIENT)
Dept: GENERAL RADIOLOGY | Facility: HOSPITAL | Age: 41
End: 2021-07-19

## 2021-07-19 VITALS
OXYGEN SATURATION: 99 % | SYSTOLIC BLOOD PRESSURE: 109 MMHG | WEIGHT: 190 LBS | HEART RATE: 66 BPM | DIASTOLIC BLOOD PRESSURE: 49 MMHG | BODY MASS INDEX: 25.73 KG/M2 | HEIGHT: 72 IN | TEMPERATURE: 97.3 F | RESPIRATION RATE: 16 BRPM

## 2021-07-19 DIAGNOSIS — M25.571 ACUTE RIGHT ANKLE PAIN: Primary | ICD-10-CM

## 2021-07-19 PROCEDURE — 73610 X-RAY EXAM OF ANKLE: CPT | Performed by: RADIOLOGY

## 2021-07-19 PROCEDURE — 73610 X-RAY EXAM OF ANKLE: CPT

## 2021-07-19 PROCEDURE — 99283 EMERGENCY DEPT VISIT LOW MDM: CPT

## 2021-07-19 NOTE — ED PROVIDER NOTES
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   Relieved by:  Rest  Worsened by:  Movement, bearing weight.       Review of Systems   Constitutional: Negative.    HENT: Negative.    Eyes: Negative.    Respiratory: Negative.    Cardiovascular: Negative.    Gastrointestinal: Negative.    Endocrine: Negative.    Genitourinary: Negative.    Musculoskeletal:        R ankle pain   Skin: Negative.    Allergic/Immunologic: Negative.    Neurological: Negative.    Hematological: Negative.    Psychiatric/Behavioral: Negative.    All other systems reviewed and are negative.      Past Medical History:   Diagnosis Date   • Alcohol abuse    • Anxiety    • Back pain    • Chronic pain disorder    • Depression    • PTSD (post-traumatic stress disorder)    • Substance abuse (CMS/[facility])    • Suicidal thoughts    • Suicide attempt (CMS/[facility])     2016-overdose attempt-\"I was shooting fentanyl in an artery.\"       No Known Allergies    Past Surgical History:   Procedure Laterality Date   • COLONOSCOPY      >10 years per pt   • EYE SURGERY Right 1998       Family History   Problem Relation Age of Onset   • Anxiety disorder Mother    • COPD Mother    • Drug abuse Father    • Alcohol abuse Father    • Brain cancer Father    • Anxiety disorder Paternal Aunt    • Anxiety disorder Maternal Grandmother    • Dementia Maternal Grandmother    • Stroke Maternal Grandmother    • Glaucoma Maternal Grandmother    • Macular degeneration Maternal Grandmother    • Coronary artery disease Maternal Grandmother    • ADD / ADHD Neg Hx    • Bipolar disorder Neg Hx    • Depression Neg Hx    • OCD Neg Hx    • Paranoid behavior Neg Hx  "   • Schizophrenia Neg Hx    • Seizures Neg Hx    • Suicide Attempts Neg Hx    • Self-Injurious Behavior  Neg Hx        Social History     Socioeconomic History   • Marital status:      Spouse name: Not on file   • Number of children: Not on file   • Years of education: Not on file   • Highest education level: Not on file   Tobacco Use   • Smoking status: Current Every Day Smoker     Packs/day: 1.00     Years: 15.00     Pack years: 15.00     Types: Cigarettes   • Smokeless tobacco: Never Used   Substance and Sexual Activity   • Alcohol use: Not Currently     Comment: see below   • Drug use: Yes     Types: Methamphetamines     Comment: etoh   • Sexual activity: Defer           Objective   Physical Exam  Vitals and nursing note reviewed.   Constitutional:       Appearance: Normal appearance. He is normal weight.   HENT:      Head: Normocephalic and atraumatic.      Right Ear: External ear normal.      Left Ear: External ear normal.      Nose: Nose normal.      Mouth/Throat:      Mouth: Mucous membranes are moist.      Pharynx: Oropharynx is clear.   Eyes:      Extraocular Movements: Extraocular movements intact.      Conjunctiva/sclera: Conjunctivae normal.   Cardiovascular:      Rate and Rhythm: Normal rate and regular rhythm.      Pulses: Normal pulses.      Heart sounds: Normal heart sounds.   Pulmonary:      Effort: Pulmonary effort is normal.      Breath sounds: Normal breath sounds.   Abdominal:      General: Abdomen is flat. Bowel sounds are normal.      Palpations: Abdomen is soft.   Musculoskeletal:         General: Normal range of motion.      Cervical back: Normal range of motion and neck supple.      Right ankle: Tenderness present.      Comments: Pt ambulatory. N/V intact.    Skin:     General: Skin is warm and dry.      Capillary Refill: Capillary refill takes less than 2 seconds.   Neurological:      General: No focal deficit present.      Mental Status: He is alert and oriented to person, place,  and time. Mental status is at baseline.   Psychiatric:         Mood and Affect: Mood normal.         Behavior: Behavior normal.         Thought Content: Thought content normal.         Judgment: Judgment normal.         Procedures           ED Course  ED Course as of Jul 19 1351   Mon Jul 19, 2021   1348 IMPRESSION:    No acute findings in the right ankle.     This report was finalized on 7/19/2021 1:45 PM by Dr. Jonathan Henderson MD.           Specimen Collected: 07/19/21 13:45         XR Ankle 3+ View Right [ML]      ED Course User Index  [ML] Landy Fonseca PA                                           MDM  Number of Diagnoses or Management Options     Amount and/or Complexity of Data Reviewed  Tests in the radiology section of CPT®: ordered and reviewed    Risk of Complications, Morbidity, and/or Mortality  Presenting problems: low  Diagnostic procedures: low  Management options: low    Patient Progress  Patient progress: improved      Final diagnoses:   Acute right ankle pain       ED Disposition  ED Disposition     ED Disposition Condition Comment    Discharge Stable           Estrella Chu MD  [address] [place] [address]  [phone]    Schedule an appointment as soon as possible for a visit in 1 day           Medication List      No changes were made to your prescriptions during this visit.          Landy Fonseca PA  07/19/21 5951

## 2021-07-20 ENCOUNTER — TELEPHONE (OUTPATIENT)
Dept: FAMILY MEDICINE CLINIC | Facility: CLINIC | Age: 41
End: 2021-07-20

## 2021-07-20 NOTE — TELEPHONE ENCOUNTER
Pharmacy Name:  [facility]    Pharmacy representative name: LASHAWN    Pharmacy representative phone number: [phone]    What medication are you calling in regards to:   sertraline (ZOLOFT) 100 MG tablet   2 ordered         Summary: Take 2 1/2 tablets orally daily., Normal        What question does the pharmacy have: PRESCRIBED 2.5 TABLET DAILY WHICH EXCEED  MG THAT INSURANCE WILL ALLOW. PRESCRIPTION CALLS  MG DAILY THE WAY IT IS CURRENTLY WRITTEN. PHARMACY IS REQUESTING A PRIOR AUTHORIZATION IF  MG IS MEDICALLY NECESSARY DOSE FOR THE PATIENT FOR APPROVAL.    Who is the provider that prescribed the medication: HEIDY VILLELA MD    Additional notes: PHARMACIST IS REQUESTING A PRIOR AUTHORIZATION OR A NEW PRESCRIPTION TO REFLECT THE INSURANCE ALLOWANCE DOSAGE  MG (MAX)    PATIENT HAS BEEN ADVISED TO ALLOW 48 HOURS FOR THE CLINICAL TEAM TO FOLLOW UP ON THIS REQUEST,  IF SYMPTOMS WORSENS TO SEEK OUT EMERGENT CARE. PATIENT  FULLY UNDERSTANDS.

## 2021-07-28 NOTE — TELEPHONE ENCOUNTER
Please PA.    Dx: Resistant depression, anxiety, panic attacks, OCD  Zoloft has worked very well for the patient. Recently has had some setbacks and is looking to increase medication dosage, but not necessarily change the medication.

## 2021-07-29 NOTE — TELEPHONE ENCOUNTER
PA was not required for this medication; The pharmacy was called and medication was approved; Voicemail left for pt that medication will be ready the morning of 7/30 at the pharmacy.

## 2021-08-04 NOTE — ED PROVIDER NOTES
MEDICAL SCREENING     Patient initially seen in triage.  The patient was advised further evaluation and diagnostic testing will be needed, some of the treatment and testing will be initiated in the lobby in order to begin the process.  The patient will be returned to the waiting area for the time being and possibly be re-assessed by a subsequent ED provider.  The patient will be brought back to the treatment area in as timely manner as possible.  C/o sore throat            Sravani Chen PA  08/04/21 0854       Sravani Chen PA  08/15/21 0038

## 2021-08-12 ENCOUNTER — HOSPITAL ENCOUNTER (EMERGENCY)
Facility: HOSPITAL | Age: 41
Discharge: LEFT BEFORE DISCHARGE | End: 2021-08-12
Attending: EMERGENCY MEDICINE | Admitting: PHYSICIAN ASSISTANT

## 2021-08-12 VITALS
HEART RATE: 61 BPM | SYSTOLIC BLOOD PRESSURE: 144 MMHG | OXYGEN SATURATION: 95 % | RESPIRATION RATE: 20 BRPM | DIASTOLIC BLOOD PRESSURE: 60 MMHG | BODY MASS INDEX: 25.73 KG/M2 | HEIGHT: 72 IN | TEMPERATURE: 98.7 F | WEIGHT: 190 LBS

## 2021-08-12 LAB
ALBUMIN SERPL-MCNC: 4.58 G/DL (ref 3.5–5.2)
ALBUMIN/GLOB SERPL: 1.4 G/DL
ALP SERPL-CCNC: 113 U/L (ref 39–117)
ALT SERPL W P-5'-P-CCNC: 106 U/L (ref 1–41)
ANION GAP SERPL CALCULATED.3IONS-SCNC: 8.8 MMOL/L (ref 5–15)
AST SERPL-CCNC: 59 U/L (ref 1–40)
BASOPHILS # BLD AUTO: 0.09 10*3/MM3 (ref 0–0.2)
BASOPHILS NFR BLD AUTO: 0.7 % (ref 0–1.5)
BILIRUB SERPL-MCNC: 0.3 MG/DL (ref 0–1.2)
BUN SERPL-MCNC: 25 MG/DL (ref 6–20)
BUN/CREAT SERPL: 27.5 (ref 7–25)
CALCIUM SPEC-SCNC: 9.4 MG/DL (ref 8.6–10.5)
CHLORIDE SERPL-SCNC: 101 MMOL/L (ref 98–107)
CO2 SERPL-SCNC: 28.2 MMOL/L (ref 22–29)
CREAT SERPL-MCNC: 0.91 MG/DL (ref 0.76–1.27)
CRP SERPL-MCNC: <0.3 MG/DL (ref 0–0.5)
DEPRECATED RDW RBC AUTO: 43.5 FL (ref 37–54)
EOSINOPHIL # BLD AUTO: 0.69 10*3/MM3 (ref 0–0.4)
EOSINOPHIL NFR BLD AUTO: 5.7 % (ref 0.3–6.2)
ERYTHROCYTE [DISTWIDTH] IN BLOOD BY AUTOMATED COUNT: 12.6 % (ref 12.3–15.4)
ETHANOL BLD-MCNC: <10 MG/DL (ref 0–10)
ETHANOL UR QL: <0.01 %
GFR SERPL CREATININE-BSD FRML MDRD: 93 ML/MIN/1.73
GLOBULIN UR ELPH-MCNC: 3.2 GM/DL
GLUCOSE SERPL-MCNC: 120 MG/DL (ref 65–99)
HCT VFR BLD AUTO: 43.2 % (ref 37.5–51)
HGB BLD-MCNC: 13.7 G/DL (ref 13–17.7)
IMM GRANULOCYTES # BLD AUTO: 0.07 10*3/MM3 (ref 0–0.05)
IMM GRANULOCYTES NFR BLD AUTO: 0.6 % (ref 0–0.5)
LYMPHOCYTES # BLD AUTO: 1.86 10*3/MM3 (ref 0.7–3.1)
LYMPHOCYTES NFR BLD AUTO: 15.4 % (ref 19.6–45.3)
MCH RBC QN AUTO: 29.7 PG (ref 26.6–33)
MCHC RBC AUTO-ENTMCNC: 31.7 G/DL (ref 31.5–35.7)
MCV RBC AUTO: 93.5 FL (ref 79–97)
MONOCYTES # BLD AUTO: 1.03 10*3/MM3 (ref 0.1–0.9)
MONOCYTES NFR BLD AUTO: 8.5 % (ref 5–12)
NEUTROPHILS NFR BLD AUTO: 69.1 % (ref 42.7–76)
NEUTROPHILS NFR BLD AUTO: 8.35 10*3/MM3 (ref 1.7–7)
NRBC BLD AUTO-RTO: 0 /100 WBC (ref 0–0.2)
PLATELET # BLD AUTO: 291 10*3/MM3 (ref 140–450)
PMV BLD AUTO: 9.1 FL (ref 6–12)
POTASSIUM SERPL-SCNC: 4.7 MMOL/L (ref 3.5–5.2)
PROT SERPL-MCNC: 7.8 G/DL (ref 6–8.5)
RBC # BLD AUTO: 4.62 10*6/MM3 (ref 4.14–5.8)
SODIUM SERPL-SCNC: 138 MMOL/L (ref 136–145)
WBC # BLD AUTO: 12.09 10*3/MM3 (ref 3.4–10.8)

## 2021-08-12 PROCEDURE — 99283 EMERGENCY DEPT VISIT LOW MDM: CPT

## 2021-08-12 PROCEDURE — 86140 C-REACTIVE PROTEIN: CPT | Performed by: PHYSICIAN ASSISTANT

## 2021-08-12 PROCEDURE — 36415 COLL VENOUS BLD VENIPUNCTURE: CPT

## 2021-08-12 PROCEDURE — 85025 COMPLETE CBC W/AUTO DIFF WBC: CPT | Performed by: PHYSICIAN ASSISTANT

## 2021-08-12 PROCEDURE — 80053 COMPREHEN METABOLIC PANEL: CPT | Performed by: PHYSICIAN ASSISTANT

## 2021-08-12 PROCEDURE — 82077 ASSAY SPEC XCP UR&BREATH IA: CPT | Performed by: PHYSICIAN ASSISTANT

## 2021-09-03 ENCOUNTER — HOSPITAL ENCOUNTER (EMERGENCY)
Facility: HOSPITAL | Age: 41
Discharge: HOME | End: 2021-09-03
Attending: STUDENT IN AN ORGANIZED HEALTH CARE EDUCATION/TRAINING PROGRAM | Admitting: STUDENT IN AN ORGANIZED HEALTH CARE EDUCATION/TRAINING PROGRAM

## 2021-09-03 ENCOUNTER — APPOINTMENT (OUTPATIENT)
Dept: GENERAL RADIOLOGY | Facility: HOSPITAL | Age: 41
End: 2021-09-03

## 2021-09-03 VITALS
RESPIRATION RATE: 16 BRPM | OXYGEN SATURATION: 98 % | HEART RATE: 77 BPM | BODY MASS INDEX: 25.77 KG/M2 | DIASTOLIC BLOOD PRESSURE: 74 MMHG | SYSTOLIC BLOOD PRESSURE: 126 MMHG | HEIGHT: 70 IN | TEMPERATURE: 100.5 F | WEIGHT: 180 LBS

## 2021-09-03 DIAGNOSIS — U07.1 COVID-19: Primary | ICD-10-CM

## 2021-09-03 DIAGNOSIS — B34.9 VIRAL ILLNESS: ICD-10-CM

## 2021-09-03 LAB
ALBUMIN SERPL-MCNC: 3.85 G/DL (ref 3.5–5.2)
ALBUMIN/GLOB SERPL: 1 G/DL
ALP SERPL-CCNC: 87 U/L (ref 39–117)
ALT SERPL W P-5'-P-CCNC: 23 U/L (ref 1–41)
ANION GAP SERPL CALCULATED.3IONS-SCNC: 11.6 MMOL/L (ref 5–15)
AST SERPL-CCNC: 24 U/L (ref 1–40)
BILIRUB SERPL-MCNC: 0.3 MG/DL (ref 0–1.2)
BUN SERPL-MCNC: 10 MG/DL (ref 6–20)
BUN/CREAT SERPL: 12 (ref 7–25)
CALCIUM SPEC-SCNC: 9.4 MG/DL (ref 8.6–10.5)
CHLORIDE SERPL-SCNC: 96 MMOL/L (ref 98–107)
CO2 SERPL-SCNC: 27.4 MMOL/L (ref 22–29)
CREAT SERPL-MCNC: 0.83 MG/DL (ref 0.76–1.27)
CRP SERPL-MCNC: 1.55 MG/DL (ref 0–0.5)
D-LACTATE SERPL-SCNC: 2.7 MMOL/L (ref 0.5–2)
D-LACTATE SERPL-SCNC: 3.3 MMOL/L (ref 0.5–2)
DEPRECATED RDW RBC AUTO: 38.5 FL (ref 37–54)
EOSINOPHIL # BLD MANUAL: 0.04 10*3/MM3 (ref 0–0.4)
EOSINOPHIL NFR BLD MANUAL: 1 % (ref 0.3–6.2)
ERYTHROCYTE [DISTWIDTH] IN BLOOD BY AUTOMATED COUNT: 11.9 % (ref 12.3–15.4)
FERRITIN SERPL-MCNC: 308.6 NG/ML (ref 30–400)
FLUAV RNA RESP QL NAA+PROBE: NOT DETECTED
FLUBV RNA RESP QL NAA+PROBE: NOT DETECTED
GFR SERPL CREATININE-BSD FRML MDRD: 103 ML/MIN/1.73
GLOBULIN UR ELPH-MCNC: 3.8 GM/DL
GLUCOSE SERPL-MCNC: 148 MG/DL (ref 65–99)
HCT VFR BLD AUTO: 43.5 % (ref 37.5–51)
HGB BLD-MCNC: 14.2 G/DL (ref 13–17.7)
HOLD SPECIMEN: NORMAL
HOLD SPECIMEN: NORMAL
LDH SERPL-CCNC: 165 U/L (ref 135–225)
LYMPHOCYTES # BLD MANUAL: 0.89 10*3/MM3 (ref 0.7–3.1)
LYMPHOCYTES NFR BLD MANUAL: 15 % (ref 5–12)
LYMPHOCYTES NFR BLD MANUAL: 22 % (ref 19.6–45.3)
MCH RBC QN AUTO: 28.9 PG (ref 26.6–33)
MCHC RBC AUTO-ENTMCNC: 32.6 G/DL (ref 31.5–35.7)
MCV RBC AUTO: 88.6 FL (ref 79–97)
MONOCYTES # BLD AUTO: 0.61 10*3/MM3 (ref 0.1–0.9)
MYELOCYTES NFR BLD MANUAL: 1 % (ref 0–0)
NEUTROPHILS # BLD AUTO: 2.48 10*3/MM3 (ref 1.7–7)
NEUTROPHILS NFR BLD MANUAL: 60 % (ref 42.7–76)
NEUTS BAND NFR BLD MANUAL: 1 % (ref 0–5)
NEUTS VAC BLD QL SMEAR: ABNORMAL
PLAT MORPH BLD: NORMAL
PLATELET # BLD AUTO: 307 10*3/MM3 (ref 140–450)
PMV BLD AUTO: 9.3 FL (ref 6–12)
POTASSIUM SERPL-SCNC: 4 MMOL/L (ref 3.5–5.2)
PROT SERPL-MCNC: 7.6 G/DL (ref 6–8.5)
RBC # BLD AUTO: 4.91 10*6/MM3 (ref 4.14–5.8)
RBC MORPH BLD: NORMAL
SARS-COV-2 RNA RESP QL NAA+PROBE: DETECTED
SCAN SLIDE: NORMAL
SODIUM SERPL-SCNC: 135 MMOL/L (ref 136–145)
WBC # BLD AUTO: 4.06 10*3/MM3 (ref 3.4–10.8)
WHOLE BLOOD HOLD SPECIMEN: NORMAL
WHOLE BLOOD HOLD SPECIMEN: NORMAL

## 2021-09-03 PROCEDURE — 99284 EMERGENCY DEPT VISIT MOD MDM: CPT

## 2021-09-03 PROCEDURE — 85007 BL SMEAR W/DIFF WBC COUNT: CPT | Performed by: NURSE PRACTITIONER

## 2021-09-03 PROCEDURE — 82728 ASSAY OF FERRITIN: CPT | Performed by: NURSE PRACTITIONER

## 2021-09-03 PROCEDURE — 71045 X-RAY EXAM CHEST 1 VIEW: CPT | Performed by: RADIOLOGY

## 2021-09-03 PROCEDURE — 83615 LACTATE (LD) (LDH) ENZYME: CPT | Performed by: NURSE PRACTITIONER

## 2021-09-03 PROCEDURE — 25010000006 INJECTION, CASIRIVIMAB AND IMDEVIMAB, 1200 MG: Performed by: STUDENT IN AN ORGANIZED HEALTH CARE EDUCATION/TRAINING PROGRAM

## 2021-09-03 PROCEDURE — 85025 COMPLETE CBC W/AUTO DIFF WBC: CPT | Performed by: NURSE PRACTITIONER

## 2021-09-03 PROCEDURE — 87636 SARSCOV2 & INF A&B AMP PRB: CPT | Performed by: NURSE PRACTITIONER

## 2021-09-03 PROCEDURE — 80053 COMPREHEN METABOLIC PANEL: CPT | Performed by: NURSE PRACTITIONER

## 2021-09-03 PROCEDURE — 83605 ASSAY OF LACTIC ACID: CPT | Performed by: NURSE PRACTITIONER

## 2021-09-03 PROCEDURE — M0243 CASIRIVI AND IMDEVI INFUSION: HCPCS | Performed by: STUDENT IN AN ORGANIZED HEALTH CARE EDUCATION/TRAINING PROGRAM

## 2021-09-03 PROCEDURE — 84145 PROCALCITONIN (PCT): CPT | Performed by: NURSE PRACTITIONER

## 2021-09-03 PROCEDURE — 71045 X-RAY EXAM CHEST 1 VIEW: CPT

## 2021-09-03 PROCEDURE — 86140 C-REACTIVE PROTEIN: CPT | Performed by: NURSE PRACTITIONER

## 2021-09-03 RX ORDER — ACETAMINOPHEN 500 MG
1000 TABLET ORAL ONCE
Status: CANCELLED | OUTPATIENT
Start: 2021-09-03 | End: 2021-09-03

## 2021-09-03 RX ORDER — DIPHENHYDRAMINE HYDROCHLORIDE 50 MG/ML
50 INJECTION INTRAMUSCULAR; INTRAVENOUS ONCE AS NEEDED
Status: CANCELLED | OUTPATIENT
Start: 2021-09-03

## 2021-09-03 RX ORDER — METHYLPREDNISOLONE SODIUM SUCCINATE 125 MG/2ML
125 INJECTION, POWDER, LYOPHILIZED, FOR SOLUTION INTRAMUSCULAR; INTRAVENOUS ONCE AS NEEDED
Status: DISCONTINUED | OUTPATIENT
Start: 2021-09-03 | End: 2021-09-03 | Stop reason: HOSPADM

## 2021-09-03 RX ORDER — EPINEPHRINE 1 MG/ML
0.3 INJECTION, SOLUTION INTRAMUSCULAR; SUBCUTANEOUS ONCE AS NEEDED
Status: DISCONTINUED | OUTPATIENT
Start: 2021-09-03 | End: 2021-09-03 | Stop reason: HOSPADM

## 2021-09-03 RX ORDER — ACETAMINOPHEN 500 MG
1000 TABLET ORAL ONCE
Status: COMPLETED | OUTPATIENT
Start: 2021-09-03 | End: 2021-09-03

## 2021-09-03 RX ORDER — DIPHENHYDRAMINE HYDROCHLORIDE 50 MG/ML
50 INJECTION INTRAMUSCULAR; INTRAVENOUS ONCE AS NEEDED
Status: DISCONTINUED | OUTPATIENT
Start: 2021-09-03 | End: 2021-09-03 | Stop reason: HOSPADM

## 2021-09-03 RX ORDER — SODIUM CHLORIDE 9 MG/ML
30 INJECTION, SOLUTION INTRAVENOUS ONCE
Status: COMPLETED | OUTPATIENT
Start: 2021-09-03 | End: 2021-09-03

## 2021-09-03 RX ORDER — ONDANSETRON 4 MG/1
4 TABLET, ORALLY DISINTEGRATING ORAL EVERY 8 HOURS PRN
Qty: 12 TABLET | Refills: 0 | Status: SHIPPED | OUTPATIENT
Start: 2021-09-03 | End: 2021-09-15

## 2021-09-03 RX ORDER — METHYLPREDNISOLONE SODIUM SUCCINATE 125 MG/2ML
125 INJECTION, POWDER, LYOPHILIZED, FOR SOLUTION INTRAMUSCULAR; INTRAVENOUS ONCE AS NEEDED
Status: CANCELLED | OUTPATIENT
Start: 2021-09-03

## 2021-09-03 RX ORDER — SODIUM CHLORIDE 0.9 % (FLUSH) 0.9 %
10 SYRINGE (ML) INJECTION AS NEEDED
Status: DISCONTINUED | OUTPATIENT
Start: 2021-09-03 | End: 2021-09-03 | Stop reason: HOSPADM

## 2021-09-03 RX ORDER — SODIUM CHLORIDE 9 MG/ML
30 INJECTION, SOLUTION INTRAVENOUS ONCE
Status: CANCELLED | OUTPATIENT
Start: 2021-09-03 | End: 2021-09-03

## 2021-09-03 RX ORDER — EPINEPHRINE 1 MG/ML
0.3 INJECTION, SOLUTION INTRAMUSCULAR; SUBCUTANEOUS ONCE AS NEEDED
Status: CANCELLED | OUTPATIENT
Start: 2021-09-03

## 2021-09-03 RX ADMIN — SODIUM CHLORIDE 1000 ML: 9 INJECTION, SOLUTION INTRAVENOUS at 18:31

## 2021-09-03 RX ADMIN — ACETAMINOPHEN 1000 MG: 500 TABLET ORAL at 18:31

## 2021-09-03 RX ADMIN — SODIUM CHLORIDE 1000 ML: 9 INJECTION, SOLUTION INTRAVENOUS at 18:30

## 2021-09-03 RX ADMIN — CASIRIVIMAB AND IMDEVIMAB: 600; 600 INJECTION, SOLUTION, CONCENTRATE INTRAVENOUS at 18:57

## 2021-09-03 RX ADMIN — SODIUM CHLORIDE 30 ML: 900 INJECTION INTRAVENOUS at 19:17

## 2021-09-03 NOTE — ED NOTES
The FDA has authorized the emergency use of REGN-COV2  which is not an FDA approved drug. Discussions with the patient regarding the risks and benefits of REGN-COV2 have occurred. The patient recognizes that this is an investigational treatment which may offer significant known and potential benefits and risks, the extent of which are unknown. Information on available alternative treatments and the risks and benefits of those alternatives was discussed. The patient received the “Fact Sheet for Patients Parents and Caregivers”. All questions from the patient were answered to satisfaction. The patient has the option to accept or refuse treatment with REGN-COV2 and would like to accept treatment.    Counseling regarding continued self isolation and use of infection control measures according to the CDC guidelines has occurred.     Sudha Vásquez, RN  09/03/21 3390

## 2021-09-03 NOTE — ED NOTES
MEDICAL SCREENING:    Reason for Visit: covid symptoms    Patient initially seen in triage.  The patient was advised further evaluation and diagnostic testing will be needed, some of the treatment and testing will be initiated in the lobby in order to begin the process.  The patient will be returned to the waiting area for the time being and possibly be re-assessed by a subsequent ED provider.  The patient will be brought back to the treatment area in as timely manner as possible.       Jo Sewell, APRN  09/03/21 1444

## 2021-09-04 LAB — PROCALCITONIN SERPL-MCNC: 0.21 NG/ML (ref 0–0.25)

## 2022-05-26 NOTE — TELEPHONE ENCOUNTER
PATIENT STATES HE WAS SUPPOSED TO HAVE A PT ORDER FOR HIS LEFT HIP. HE IS ALREADY BEING TREATED FOR RIGHT HIP. CAN YOU PLACE ORDER? PATIENT IS ALREADY SCHEDULED FOR PT.   Hide Cerave Products: No Action 2: Continue Plan: Acne - inflammatory papules, pustules and post-inflammatory hyperpigmention. Prior treatment with doxycycline and epiduo. \\n- Start Spironolactone 100mg. Take one tablet daily. Explained most common side effects. \\n- Start Tretinoin 0.025% cream. Apply a pea-sized amount to face twice a week x 2 weeks, then add one night per week as tolerated until using nightly. Apply Tretinoin after applying moisturizing cream to help reduce irritation. \\n- Start Clindamycin 1% lotion. Apply once daily in the morning before moisturizer and sunscreen. \\n- Discontinue Epi/Duo \\n- It may take 3-6 months to see improvement in acne. \\n- Recheck in 3 months (prior to returning to [place]) sooner if needed. Detail Level: Zone